# Patient Record
Sex: FEMALE | Race: WHITE | NOT HISPANIC OR LATINO
[De-identification: names, ages, dates, MRNs, and addresses within clinical notes are randomized per-mention and may not be internally consistent; named-entity substitution may affect disease eponyms.]

---

## 2018-05-01 ENCOUNTER — RESULT REVIEW (OUTPATIENT)
Age: 38
End: 2018-05-01

## 2018-08-09 ENCOUNTER — ASOB RESULT (OUTPATIENT)
Age: 38
End: 2018-08-09

## 2018-08-09 ENCOUNTER — APPOINTMENT (OUTPATIENT)
Dept: ANTEPARTUM | Facility: CLINIC | Age: 38
End: 2018-08-09
Payer: COMMERCIAL

## 2018-08-09 PROCEDURE — 76817 TRANSVAGINAL US OBSTETRIC: CPT

## 2018-08-09 PROCEDURE — 76811 OB US DETAILED SNGL FETUS: CPT

## 2018-08-09 PROCEDURE — 99241 OFFICE CONSULTATION NEW/ESTAB PATIENT 15 MIN: CPT | Mod: 25

## 2018-09-26 ENCOUNTER — APPOINTMENT (OUTPATIENT)
Dept: MATERNAL FETAL MEDICINE | Facility: CLINIC | Age: 38
End: 2018-09-26
Payer: COMMERCIAL

## 2018-09-26 PROCEDURE — G0109 DIAB MANAGE TRN IND/GROUP: CPT

## 2018-10-01 ENCOUNTER — APPOINTMENT (OUTPATIENT)
Dept: MATERNAL FETAL MEDICINE | Facility: CLINIC | Age: 38
End: 2018-10-01
Payer: COMMERCIAL

## 2018-10-01 ENCOUNTER — ASOB RESULT (OUTPATIENT)
Age: 38
End: 2018-10-01

## 2018-10-01 DIAGNOSIS — O24.419 GESTATIONAL DIABETES MELLITUS IN PREGNANCY, UNSPECIFIED CONTROL: ICD-10-CM

## 2018-10-01 PROCEDURE — G0109 DIAB MANAGE TRN IND/GROUP: CPT

## 2018-10-01 RX ORDER — LANCETS 33 GAUGE
EACH MISCELLANEOUS
Qty: 2 | Refills: 6 | Status: COMPLETED | COMMUNITY
Start: 2018-10-01 | End: 2019-10-01

## 2018-10-01 RX ORDER — URINE ACETONE TEST STRIPS
STRIP MISCELLANEOUS
Qty: 1 | Refills: 1 | Status: COMPLETED | COMMUNITY
Start: 2018-10-01 | End: 2019-10-01

## 2018-10-08 ENCOUNTER — APPOINTMENT (OUTPATIENT)
Dept: MATERNAL FETAL MEDICINE | Facility: CLINIC | Age: 38
End: 2018-10-08
Payer: COMMERCIAL

## 2018-10-08 ENCOUNTER — ASOB RESULT (OUTPATIENT)
Age: 38
End: 2018-10-08

## 2018-10-08 PROCEDURE — G0108 DIAB MANAGE TRN  PER INDIV: CPT

## 2018-10-08 RX ORDER — ISOPROPYL ALCOHOL 70 ML/100ML
SWAB TOPICAL
Qty: 1 | Refills: 0 | Status: ACTIVE | COMMUNITY
Start: 2018-10-08 | End: 1900-01-01

## 2018-10-15 ENCOUNTER — MEDICATION RENEWAL (OUTPATIENT)
Age: 38
End: 2018-10-15

## 2018-10-15 RX ORDER — SYRING-NEEDL,DISP,INSUL,0.3 ML 31 GX5/16"
31G X 5/16" SYRINGE, EMPTY DISPOSABLE MISCELLANEOUS
Qty: 1 | Refills: 3 | Status: ACTIVE | COMMUNITY
Start: 2018-10-15 | End: 1900-01-01

## 2018-10-15 RX ORDER — PEN NEEDLE, DIABETIC 29 G X1/2"
32G X 4 MM NEEDLE, DISPOSABLE MISCELLANEOUS
Qty: 1 | Refills: 1 | Status: DISCONTINUED | COMMUNITY
Start: 2018-10-08 | End: 2018-10-15

## 2018-10-15 RX ORDER — INSULIN HUMAN 100 [IU]/ML
100 INJECTION, SUSPENSION SUBCUTANEOUS
Qty: 1 | Refills: 1 | Status: DISCONTINUED | COMMUNITY
Start: 2018-10-08 | End: 2018-10-15

## 2018-10-15 RX ORDER — HUMAN INSULIN 100 [IU]/ML
100 INJECTION, SUSPENSION SUBCUTANEOUS
Qty: 1 | Refills: 2 | Status: ACTIVE | COMMUNITY
Start: 2018-10-15 | End: 1900-01-01

## 2018-10-19 ENCOUNTER — APPOINTMENT (OUTPATIENT)
Dept: MATERNAL FETAL MEDICINE | Facility: CLINIC | Age: 38
End: 2018-10-19
Payer: COMMERCIAL

## 2018-10-19 ENCOUNTER — ASOB RESULT (OUTPATIENT)
Age: 38
End: 2018-10-19

## 2018-10-19 PROCEDURE — G0108 DIAB MANAGE TRN  PER INDIV: CPT

## 2018-10-26 ENCOUNTER — APPOINTMENT (OUTPATIENT)
Dept: ULTRASOUND IMAGING | Facility: CLINIC | Age: 38
End: 2018-10-26
Payer: COMMERCIAL

## 2018-10-26 ENCOUNTER — OUTPATIENT (OUTPATIENT)
Dept: OUTPATIENT SERVICES | Facility: HOSPITAL | Age: 38
LOS: 1 days | End: 2018-10-26
Payer: COMMERCIAL

## 2018-10-26 DIAGNOSIS — Z00.8 ENCOUNTER FOR OTHER GENERAL EXAMINATION: ICD-10-CM

## 2018-10-26 PROCEDURE — 93971 EXTREMITY STUDY: CPT

## 2018-10-26 PROCEDURE — 93971 EXTREMITY STUDY: CPT | Mod: 26,RT

## 2018-11-02 ENCOUNTER — ASOB RESULT (OUTPATIENT)
Age: 38
End: 2018-11-02

## 2018-11-02 ENCOUNTER — APPOINTMENT (OUTPATIENT)
Dept: MATERNAL FETAL MEDICINE | Facility: CLINIC | Age: 38
End: 2018-11-02
Payer: COMMERCIAL

## 2018-11-02 PROCEDURE — G0108 DIAB MANAGE TRN  PER INDIV: CPT

## 2018-11-30 ENCOUNTER — OTHER (OUTPATIENT)
Age: 38
End: 2018-11-30

## 2018-12-07 ENCOUNTER — TRANSCRIPTION ENCOUNTER (OUTPATIENT)
Age: 38
End: 2018-12-07

## 2018-12-07 ENCOUNTER — INPATIENT (INPATIENT)
Facility: HOSPITAL | Age: 38
LOS: 3 days | Discharge: HOME | End: 2018-12-11
Attending: OBSTETRICS & GYNECOLOGY | Admitting: OBSTETRICS & GYNECOLOGY
Payer: COMMERCIAL

## 2018-12-07 VITALS — WEIGHT: 257.94 LBS | HEIGHT: 70 IN

## 2018-12-07 DIAGNOSIS — O26.899 OTHER SPECIFIED PREGNANCY RELATED CONDITIONS, UNSPECIFIED TRIMESTER: ICD-10-CM

## 2018-12-07 DIAGNOSIS — Z34.80 ENCOUNTER FOR SUPERVISION OF OTHER NORMAL PREGNANCY, UNSPECIFIED TRIMESTER: ICD-10-CM

## 2018-12-07 DIAGNOSIS — Z3A.00 WEEKS OF GESTATION OF PREGNANCY NOT SPECIFIED: ICD-10-CM

## 2018-12-07 LAB
ALBUMIN SERPL ELPH-MCNC: 3.1 G/DL — LOW (ref 3.3–5)
ALBUMIN SERPL ELPH-MCNC: 3.7 G/DL — SIGNIFICANT CHANGE UP (ref 3.3–5)
ALP SERPL-CCNC: 119 U/L — SIGNIFICANT CHANGE UP (ref 40–120)
ALP SERPL-CCNC: 136 U/L — HIGH (ref 40–120)
ALT FLD-CCNC: 10 U/L — SIGNIFICANT CHANGE UP (ref 10–45)
ALT FLD-CCNC: 12 U/L — SIGNIFICANT CHANGE UP (ref 10–45)
ANION GAP SERPL CALC-SCNC: 12 MMOL/L — SIGNIFICANT CHANGE UP (ref 5–17)
ANION GAP SERPL CALC-SCNC: 13 MMOL/L — SIGNIFICANT CHANGE UP (ref 5–17)
APPEARANCE UR: CLEAR — SIGNIFICANT CHANGE UP
APTT BLD: 26.4 SEC — LOW (ref 27.5–36.3)
APTT BLD: 27.6 SEC — SIGNIFICANT CHANGE UP (ref 27.5–36.3)
AST SERPL-CCNC: 14 U/L — SIGNIFICANT CHANGE UP (ref 10–40)
AST SERPL-CCNC: 14 U/L — SIGNIFICANT CHANGE UP (ref 10–40)
BASOPHILS # BLD AUTO: 0 K/UL — SIGNIFICANT CHANGE UP (ref 0–0.2)
BASOPHILS # BLD AUTO: 0 K/UL — SIGNIFICANT CHANGE UP (ref 0–0.2)
BASOPHILS NFR BLD AUTO: 0.1 % — SIGNIFICANT CHANGE UP (ref 0–2)
BASOPHILS NFR BLD AUTO: 0.2 % — SIGNIFICANT CHANGE UP (ref 0–2)
BILIRUB SERPL-MCNC: 1 MG/DL — SIGNIFICANT CHANGE UP (ref 0.2–1.2)
BILIRUB SERPL-MCNC: 1.2 MG/DL — SIGNIFICANT CHANGE UP (ref 0.2–1.2)
BILIRUB UR-MCNC: NEGATIVE — SIGNIFICANT CHANGE UP
BLD GP AB SCN SERPL QL: NEGATIVE — SIGNIFICANT CHANGE UP
BUN SERPL-MCNC: 6 MG/DL — LOW (ref 7–23)
BUN SERPL-MCNC: 7 MG/DL — SIGNIFICANT CHANGE UP (ref 7–23)
CALCIUM SERPL-MCNC: 8.4 MG/DL — SIGNIFICANT CHANGE UP (ref 8.4–10.5)
CALCIUM SERPL-MCNC: 9 MG/DL — SIGNIFICANT CHANGE UP (ref 8.4–10.5)
CHLORIDE SERPL-SCNC: 103 MMOL/L — SIGNIFICANT CHANGE UP (ref 96–108)
CHLORIDE SERPL-SCNC: 104 MMOL/L — SIGNIFICANT CHANGE UP (ref 96–108)
CO2 SERPL-SCNC: 20 MMOL/L — LOW (ref 22–31)
CO2 SERPL-SCNC: 22 MMOL/L — SIGNIFICANT CHANGE UP (ref 22–31)
COLOR SPEC: SIGNIFICANT CHANGE UP
CREAT ?TM UR-MCNC: 44 MG/DL — SIGNIFICANT CHANGE UP
CREAT SERPL-MCNC: 0.45 MG/DL — LOW (ref 0.5–1.3)
CREAT SERPL-MCNC: 0.46 MG/DL — LOW (ref 0.5–1.3)
DIFF PNL FLD: NEGATIVE — SIGNIFICANT CHANGE UP
EOSINOPHIL # BLD AUTO: 0.1 K/UL — SIGNIFICANT CHANGE UP (ref 0–0.5)
EOSINOPHIL # BLD AUTO: 0.1 K/UL — SIGNIFICANT CHANGE UP (ref 0–0.5)
EOSINOPHIL NFR BLD AUTO: 0.9 % — SIGNIFICANT CHANGE UP (ref 0–6)
EOSINOPHIL NFR BLD AUTO: 0.9 % — SIGNIFICANT CHANGE UP (ref 0–6)
FIBRINOGEN PPP-MCNC: 695 MG/DL — HIGH (ref 350–510)
FIBRINOGEN PPP-MCNC: 890 MG/DL — HIGH (ref 350–510)
GLUCOSE BLDC GLUCOMTR-MCNC: 102 MG/DL — HIGH (ref 70–99)
GLUCOSE BLDC GLUCOMTR-MCNC: 76 MG/DL — SIGNIFICANT CHANGE UP (ref 70–99)
GLUCOSE BLDC GLUCOMTR-MCNC: 84 MG/DL — SIGNIFICANT CHANGE UP (ref 70–99)
GLUCOSE SERPL-MCNC: 114 MG/DL — HIGH (ref 70–99)
GLUCOSE SERPL-MCNC: 80 MG/DL — SIGNIFICANT CHANGE UP (ref 70–99)
GLUCOSE UR QL: NEGATIVE — SIGNIFICANT CHANGE UP
HCT VFR BLD CALC: 34.2 % — LOW (ref 34.5–45)
HCT VFR BLD CALC: 38.3 % — SIGNIFICANT CHANGE UP (ref 34.5–45)
HGB BLD-MCNC: 12 G/DL — SIGNIFICANT CHANGE UP (ref 11.5–15.5)
HGB BLD-MCNC: 13.5 G/DL — SIGNIFICANT CHANGE UP (ref 11.5–15.5)
INR BLD: 0.92 RATIO — SIGNIFICANT CHANGE UP (ref 0.88–1.16)
INR BLD: 0.98 RATIO — SIGNIFICANT CHANGE UP (ref 0.88–1.16)
KETONES UR-MCNC: NEGATIVE — SIGNIFICANT CHANGE UP
LDH SERPL L TO P-CCNC: 169 U/L — SIGNIFICANT CHANGE UP (ref 50–242)
LDH SERPL L TO P-CCNC: 194 U/L — SIGNIFICANT CHANGE UP (ref 50–242)
LEUKOCYTE ESTERASE UR-ACNC: NEGATIVE — SIGNIFICANT CHANGE UP
LYMPHOCYTES # BLD AUTO: 1.3 K/UL — SIGNIFICANT CHANGE UP (ref 1–3.3)
LYMPHOCYTES # BLD AUTO: 1.6 K/UL — SIGNIFICANT CHANGE UP (ref 1–3.3)
LYMPHOCYTES # BLD AUTO: 15.1 % — SIGNIFICANT CHANGE UP (ref 13–44)
LYMPHOCYTES # BLD AUTO: 19.4 % — SIGNIFICANT CHANGE UP (ref 13–44)
MCHC RBC-ENTMCNC: 29.8 PG — SIGNIFICANT CHANGE UP (ref 27–34)
MCHC RBC-ENTMCNC: 29.9 PG — SIGNIFICANT CHANGE UP (ref 27–34)
MCHC RBC-ENTMCNC: 35 GM/DL — SIGNIFICANT CHANGE UP (ref 32–36)
MCHC RBC-ENTMCNC: 35.2 GM/DL — SIGNIFICANT CHANGE UP (ref 32–36)
MCV RBC AUTO: 85.2 FL — SIGNIFICANT CHANGE UP (ref 80–100)
MCV RBC AUTO: 85.2 FL — SIGNIFICANT CHANGE UP (ref 80–100)
MONOCYTES # BLD AUTO: 0.5 K/UL — SIGNIFICANT CHANGE UP (ref 0–0.9)
MONOCYTES # BLD AUTO: 0.7 K/UL — SIGNIFICANT CHANGE UP (ref 0–0.9)
MONOCYTES NFR BLD AUTO: 5.3 % — SIGNIFICANT CHANGE UP (ref 2–14)
MONOCYTES NFR BLD AUTO: 8.2 % — SIGNIFICANT CHANGE UP (ref 2–14)
NEUTROPHILS # BLD AUTO: 6 K/UL — SIGNIFICANT CHANGE UP (ref 1.8–7.4)
NEUTROPHILS # BLD AUTO: 6.7 K/UL — SIGNIFICANT CHANGE UP (ref 1.8–7.4)
NEUTROPHILS NFR BLD AUTO: 71.4 % — SIGNIFICANT CHANGE UP (ref 43–77)
NEUTROPHILS NFR BLD AUTO: 78.5 % — HIGH (ref 43–77)
NITRITE UR-MCNC: NEGATIVE — SIGNIFICANT CHANGE UP
PH UR: 7 — SIGNIFICANT CHANGE UP (ref 5–8)
PLATELET # BLD AUTO: 152 K/UL — SIGNIFICANT CHANGE UP (ref 150–400)
PLATELET # BLD AUTO: 178 K/UL — SIGNIFICANT CHANGE UP (ref 150–400)
POTASSIUM SERPL-MCNC: 3.3 MMOL/L — LOW (ref 3.5–5.3)
POTASSIUM SERPL-MCNC: 3.6 MMOL/L — SIGNIFICANT CHANGE UP (ref 3.5–5.3)
POTASSIUM SERPL-SCNC: 3.3 MMOL/L — LOW (ref 3.5–5.3)
POTASSIUM SERPL-SCNC: 3.6 MMOL/L — SIGNIFICANT CHANGE UP (ref 3.5–5.3)
PROT ?TM UR-MCNC: 19 MG/DL — HIGH (ref 0–12)
PROT SERPL-MCNC: 6.2 G/DL — SIGNIFICANT CHANGE UP (ref 6–8.3)
PROT SERPL-MCNC: 7 G/DL — SIGNIFICANT CHANGE UP (ref 6–8.3)
PROT UR-MCNC: SIGNIFICANT CHANGE UP
PROT/CREAT UR-RTO: 0.4 RATIO — HIGH (ref 0–0.2)
PROTHROM AB SERPL-ACNC: 10.5 SEC — SIGNIFICANT CHANGE UP (ref 10–12.9)
PROTHROM AB SERPL-ACNC: 11.2 SEC — SIGNIFICANT CHANGE UP (ref 10–12.9)
RBC # BLD: 4.01 M/UL — SIGNIFICANT CHANGE UP (ref 3.8–5.2)
RBC # BLD: 4.5 M/UL — SIGNIFICANT CHANGE UP (ref 3.8–5.2)
RBC # FLD: 12.5 % — SIGNIFICANT CHANGE UP (ref 10.3–14.5)
RBC # FLD: 12.5 % — SIGNIFICANT CHANGE UP (ref 10.3–14.5)
RH IG SCN BLD-IMP: POSITIVE — SIGNIFICANT CHANGE UP
SODIUM SERPL-SCNC: 137 MMOL/L — SIGNIFICANT CHANGE UP (ref 135–145)
SODIUM SERPL-SCNC: 137 MMOL/L — SIGNIFICANT CHANGE UP (ref 135–145)
SP GR SPEC: 1.01 — SIGNIFICANT CHANGE UP (ref 1.01–1.02)
URATE SERPL-MCNC: 3.2 MG/DL — SIGNIFICANT CHANGE UP (ref 2.5–7)
URATE SERPL-MCNC: 3.7 MG/DL — SIGNIFICANT CHANGE UP (ref 2.5–7)
UROBILINOGEN FLD QL: NEGATIVE — SIGNIFICANT CHANGE UP
WBC # BLD: 8.4 K/UL — SIGNIFICANT CHANGE UP (ref 3.8–10.5)
WBC # BLD: 8.6 K/UL — SIGNIFICANT CHANGE UP (ref 3.8–10.5)
WBC # FLD AUTO: 8.4 K/UL — SIGNIFICANT CHANGE UP (ref 3.8–10.5)
WBC # FLD AUTO: 8.6 K/UL — SIGNIFICANT CHANGE UP (ref 3.8–10.5)

## 2018-12-07 RX ORDER — SODIUM CHLORIDE 9 MG/ML
3 INJECTION INTRAMUSCULAR; INTRAVENOUS; SUBCUTANEOUS EVERY 8 HOURS
Qty: 0 | Refills: 0 | Status: DISCONTINUED | OUTPATIENT
Start: 2018-12-07 | End: 2018-12-09

## 2018-12-07 RX ORDER — OXYTOCIN 10 UNIT/ML
333.33 VIAL (ML) INJECTION
Qty: 20 | Refills: 0 | Status: DISCONTINUED | OUTPATIENT
Start: 2018-12-07 | End: 2018-12-08

## 2018-12-07 RX ORDER — SODIUM CHLORIDE 9 MG/ML
1000 INJECTION, SOLUTION INTRAVENOUS
Qty: 0 | Refills: 0 | Status: DISCONTINUED | OUTPATIENT
Start: 2018-12-07 | End: 2018-12-08

## 2018-12-07 RX ORDER — CITRIC ACID/SODIUM CITRATE 300-500 MG
15 SOLUTION, ORAL ORAL EVERY 4 HOURS
Qty: 0 | Refills: 0 | Status: DISCONTINUED | OUTPATIENT
Start: 2018-12-07 | End: 2018-12-08

## 2018-12-07 RX ORDER — SODIUM CHLORIDE 9 MG/ML
500 INJECTION, SOLUTION INTRAVENOUS ONCE
Qty: 0 | Refills: 0 | Status: DISCONTINUED | OUTPATIENT
Start: 2018-12-07 | End: 2018-12-08

## 2018-12-07 RX ORDER — SODIUM CHLORIDE 9 MG/ML
1000 INJECTION INTRAMUSCULAR; INTRAVENOUS; SUBCUTANEOUS
Qty: 0 | Refills: 0 | Status: DISCONTINUED | OUTPATIENT
Start: 2018-12-07 | End: 2018-12-08

## 2018-12-08 ENCOUNTER — RESULT REVIEW (OUTPATIENT)
Age: 38
End: 2018-12-08

## 2018-12-08 LAB
ALBUMIN SERPL ELPH-MCNC: 2.6 G/DL — LOW (ref 3.3–5)
ALP SERPL-CCNC: 103 U/L — SIGNIFICANT CHANGE UP (ref 40–120)
ALT FLD-CCNC: 8 U/L — LOW (ref 10–45)
ANION GAP SERPL CALC-SCNC: 11 MMOL/L — SIGNIFICANT CHANGE UP (ref 5–17)
APTT BLD: 21.7 SEC — LOW (ref 27.5–36.3)
APTT BLD: 26.5 SEC — LOW (ref 27.5–36.3)
APTT BLD: 26.6 SEC — LOW (ref 27.5–36.3)
AST SERPL-CCNC: 14 U/L — SIGNIFICANT CHANGE UP (ref 10–40)
BASOPHILS # BLD AUTO: 0 K/UL — SIGNIFICANT CHANGE UP (ref 0–0.2)
BASOPHILS NFR BLD AUTO: 0 % — SIGNIFICANT CHANGE UP (ref 0–2)
BASOPHILS NFR BLD AUTO: 0.1 % — SIGNIFICANT CHANGE UP (ref 0–2)
BASOPHILS NFR BLD AUTO: 0.2 % — SIGNIFICANT CHANGE UP (ref 0–2)
BILIRUB SERPL-MCNC: 1.3 MG/DL — HIGH (ref 0.2–1.2)
BUN SERPL-MCNC: 6 MG/DL — LOW (ref 7–23)
CALCIUM SERPL-MCNC: 7.5 MG/DL — LOW (ref 8.4–10.5)
CHLORIDE SERPL-SCNC: 109 MMOL/L — HIGH (ref 96–108)
CO2 SERPL-SCNC: 20 MMOL/L — LOW (ref 22–31)
CREAT SERPL-MCNC: 0.48 MG/DL — LOW (ref 0.5–1.3)
EOSINOPHIL # BLD AUTO: 0.1 K/UL — SIGNIFICANT CHANGE UP (ref 0–0.5)
EOSINOPHIL NFR BLD AUTO: 0.5 % — SIGNIFICANT CHANGE UP (ref 0–6)
EOSINOPHIL NFR BLD AUTO: 0.6 % — SIGNIFICANT CHANGE UP (ref 0–6)
EOSINOPHIL NFR BLD AUTO: 0.7 % — SIGNIFICANT CHANGE UP (ref 0–6)
FIBRINOGEN PPP-MCNC: 556 MG/DL — HIGH (ref 350–510)
FIBRINOGEN PPP-MCNC: 579 MG/DL — HIGH (ref 350–510)
FIBRINOGEN PPP-MCNC: 600 MG/DL — HIGH (ref 350–510)
GLUCOSE BLDC GLUCOMTR-MCNC: 81 MG/DL — SIGNIFICANT CHANGE UP (ref 70–99)
GLUCOSE BLDC GLUCOMTR-MCNC: 85 MG/DL — SIGNIFICANT CHANGE UP (ref 70–99)
GLUCOSE BLDC GLUCOMTR-MCNC: 90 MG/DL — SIGNIFICANT CHANGE UP (ref 70–99)
GLUCOSE SERPL-MCNC: 108 MG/DL — HIGH (ref 70–99)
HCT VFR BLD CALC: 29.6 % — LOW (ref 34.5–45)
HCT VFR BLD CALC: 30.7 % — LOW (ref 34.5–45)
HCT VFR BLD CALC: 31.2 % — LOW (ref 34.5–45)
HGB BLD-MCNC: 10.6 G/DL — LOW (ref 11.5–15.5)
HGB BLD-MCNC: 10.9 G/DL — LOW (ref 11.5–15.5)
HGB BLD-MCNC: 10.9 G/DL — LOW (ref 11.5–15.5)
INR BLD: 0.98 RATIO — SIGNIFICANT CHANGE UP (ref 0.88–1.16)
INR BLD: 0.98 RATIO — SIGNIFICANT CHANGE UP (ref 0.88–1.16)
INR BLD: 1.03 RATIO — SIGNIFICANT CHANGE UP (ref 0.88–1.16)
LDH SERPL L TO P-CCNC: 176 U/L — SIGNIFICANT CHANGE UP (ref 50–242)
LYMPHOCYTES # BLD AUTO: 0.9 K/UL — LOW (ref 1–3.3)
LYMPHOCYTES # BLD AUTO: 1 K/UL — SIGNIFICANT CHANGE UP (ref 1–3.3)
LYMPHOCYTES # BLD AUTO: 1.6 K/UL — SIGNIFICANT CHANGE UP (ref 1–3.3)
LYMPHOCYTES # BLD AUTO: 10.4 % — LOW (ref 13–44)
LYMPHOCYTES # BLD AUTO: 7.1 % — LOW (ref 13–44)
LYMPHOCYTES # BLD AUTO: 9.8 % — LOW (ref 13–44)
MCHC RBC-ENTMCNC: 30.1 PG — SIGNIFICANT CHANGE UP (ref 27–34)
MCHC RBC-ENTMCNC: 30.6 PG — SIGNIFICANT CHANGE UP (ref 27–34)
MCHC RBC-ENTMCNC: 31 PG — SIGNIFICANT CHANGE UP (ref 27–34)
MCHC RBC-ENTMCNC: 34.8 GM/DL — SIGNIFICANT CHANGE UP (ref 32–36)
MCHC RBC-ENTMCNC: 35.4 GM/DL — SIGNIFICANT CHANGE UP (ref 32–36)
MCHC RBC-ENTMCNC: 35.9 GM/DL — SIGNIFICANT CHANGE UP (ref 32–36)
MCV RBC AUTO: 86.3 FL — SIGNIFICANT CHANGE UP (ref 80–100)
MCV RBC AUTO: 86.4 FL — SIGNIFICANT CHANGE UP (ref 80–100)
MCV RBC AUTO: 86.5 FL — SIGNIFICANT CHANGE UP (ref 80–100)
MONOCYTES # BLD AUTO: 0.7 K/UL — SIGNIFICANT CHANGE UP (ref 0–0.9)
MONOCYTES # BLD AUTO: 0.8 K/UL — SIGNIFICANT CHANGE UP (ref 0–0.9)
MONOCYTES # BLD AUTO: 1.1 K/UL — HIGH (ref 0–0.9)
MONOCYTES NFR BLD AUTO: 6.5 % — SIGNIFICANT CHANGE UP (ref 2–14)
MONOCYTES NFR BLD AUTO: 7.1 % — SIGNIFICANT CHANGE UP (ref 2–14)
MONOCYTES NFR BLD AUTO: 7.2 % — SIGNIFICANT CHANGE UP (ref 2–14)
NEUTROPHILS # BLD AUTO: 10.3 K/UL — HIGH (ref 1.8–7.4)
NEUTROPHILS # BLD AUTO: 12.8 K/UL — HIGH (ref 1.8–7.4)
NEUTROPHILS # BLD AUTO: 8.5 K/UL — HIGH (ref 1.8–7.4)
NEUTROPHILS NFR BLD AUTO: 81.7 % — HIGH (ref 43–77)
NEUTROPHILS NFR BLD AUTO: 82.5 % — HIGH (ref 43–77)
NEUTROPHILS NFR BLD AUTO: 85.7 % — HIGH (ref 43–77)
PLATELET # BLD AUTO: 115 K/UL — LOW (ref 150–400)
PLATELET # BLD AUTO: 139 K/UL — LOW (ref 150–400)
PLATELET # BLD AUTO: 179 K/UL — SIGNIFICANT CHANGE UP (ref 150–400)
POTASSIUM SERPL-MCNC: 3.7 MMOL/L — SIGNIFICANT CHANGE UP (ref 3.5–5.3)
POTASSIUM SERPL-SCNC: 3.7 MMOL/L — SIGNIFICANT CHANGE UP (ref 3.5–5.3)
PROT SERPL-MCNC: 5.1 G/DL — LOW (ref 6–8.3)
PROTHROM AB SERPL-ACNC: 11.3 SEC — SIGNIFICANT CHANGE UP (ref 10–12.9)
PROTHROM AB SERPL-ACNC: 11.3 SEC — SIGNIFICANT CHANGE UP (ref 10–12.9)
PROTHROM AB SERPL-ACNC: 11.9 SEC — SIGNIFICANT CHANGE UP (ref 10–12.9)
RBC # BLD: 3.42 M/UL — LOW (ref 3.8–5.2)
RBC # BLD: 3.55 M/UL — LOW (ref 3.8–5.2)
RBC # BLD: 3.61 M/UL — LOW (ref 3.8–5.2)
RBC # FLD: 12.4 % — SIGNIFICANT CHANGE UP (ref 10.3–14.5)
RBC # FLD: 12.7 % — SIGNIFICANT CHANGE UP (ref 10.3–14.5)
RBC # FLD: 12.9 % — SIGNIFICANT CHANGE UP (ref 10.3–14.5)
SODIUM SERPL-SCNC: 140 MMOL/L — SIGNIFICANT CHANGE UP (ref 135–145)
T PALLIDUM AB TITR SER: NEGATIVE — SIGNIFICANT CHANGE UP
URATE SERPL-MCNC: 3.6 MG/DL — SIGNIFICANT CHANGE UP (ref 2.5–7)
WBC # BLD: 10.3 K/UL — SIGNIFICANT CHANGE UP (ref 3.8–10.5)
WBC # BLD: 12.1 K/UL — HIGH (ref 3.8–10.5)
WBC # BLD: 15.6 K/UL — HIGH (ref 3.8–10.5)
WBC # FLD AUTO: 10.3 K/UL — SIGNIFICANT CHANGE UP (ref 3.8–10.5)
WBC # FLD AUTO: 12.1 K/UL — HIGH (ref 3.8–10.5)
WBC # FLD AUTO: 15.6 K/UL — HIGH (ref 3.8–10.5)

## 2018-12-08 PROCEDURE — 88307 TISSUE EXAM BY PATHOLOGIST: CPT | Mod: 26

## 2018-12-08 PROCEDURE — 88302 TISSUE EXAM BY PATHOLOGIST: CPT | Mod: 26

## 2018-12-08 RX ORDER — OXYTOCIN 10 UNIT/ML
41.67 VIAL (ML) INJECTION
Qty: 20 | Refills: 0 | Status: DISCONTINUED | OUTPATIENT
Start: 2018-12-08 | End: 2018-12-09

## 2018-12-08 RX ORDER — ACETAMINOPHEN 500 MG
975 TABLET ORAL EVERY 6 HOURS
Qty: 0 | Refills: 0 | Status: DISCONTINUED | OUTPATIENT
Start: 2018-12-08 | End: 2018-12-09

## 2018-12-08 RX ORDER — ACETAMINOPHEN 500 MG
1000 TABLET ORAL ONCE
Qty: 0 | Refills: 0 | Status: COMPLETED | OUTPATIENT
Start: 2018-12-08 | End: 2018-12-08

## 2018-12-08 RX ORDER — IBUPROFEN 200 MG
600 TABLET ORAL EVERY 6 HOURS
Qty: 0 | Refills: 0 | Status: DISCONTINUED | OUTPATIENT
Start: 2018-12-08 | End: 2018-12-09

## 2018-12-08 RX ORDER — TETANUS TOXOID, REDUCED DIPHTHERIA TOXOID AND ACELLULAR PERTUSSIS VACCINE, ADSORBED 5; 2.5; 8; 8; 2.5 [IU]/.5ML; [IU]/.5ML; UG/.5ML; UG/.5ML; UG/.5ML
0.5 SUSPENSION INTRAMUSCULAR ONCE
Qty: 0 | Refills: 0 | Status: DISCONTINUED | OUTPATIENT
Start: 2018-12-09 | End: 2018-12-11

## 2018-12-08 RX ORDER — SODIUM CHLORIDE 9 MG/ML
1000 INJECTION, SOLUTION INTRAVENOUS
Qty: 0 | Refills: 0 | Status: DISCONTINUED | OUTPATIENT
Start: 2018-12-08 | End: 2018-12-08

## 2018-12-08 RX ORDER — NALOXONE HYDROCHLORIDE 4 MG/.1ML
0.1 SPRAY NASAL
Qty: 0 | Refills: 0 | Status: DISCONTINUED | OUTPATIENT
Start: 2018-12-08 | End: 2018-12-10

## 2018-12-08 RX ORDER — DOCUSATE SODIUM 100 MG
100 CAPSULE ORAL
Qty: 0 | Refills: 0 | Status: DISCONTINUED | OUTPATIENT
Start: 2018-12-09 | End: 2018-12-09

## 2018-12-08 RX ORDER — SODIUM CHLORIDE 9 MG/ML
1000 INJECTION INTRAMUSCULAR; INTRAVENOUS; SUBCUTANEOUS
Qty: 0 | Refills: 0 | Status: DISCONTINUED | OUTPATIENT
Start: 2018-12-08 | End: 2018-12-09

## 2018-12-08 RX ORDER — DIPHENHYDRAMINE HCL 50 MG
25 CAPSULE ORAL EVERY 6 HOURS
Qty: 0 | Refills: 0 | Status: DISCONTINUED | OUTPATIENT
Start: 2018-12-09 | End: 2018-12-09

## 2018-12-08 RX ORDER — SODIUM CHLORIDE 9 MG/ML
1000 INJECTION, SOLUTION INTRAVENOUS
Qty: 0 | Refills: 0 | Status: DISCONTINUED | OUTPATIENT
Start: 2018-12-09 | End: 2018-12-09

## 2018-12-08 RX ORDER — LANOLIN
1 OINTMENT (GRAM) TOPICAL
Qty: 0 | Refills: 0 | Status: DISCONTINUED | OUTPATIENT
Start: 2018-12-09 | End: 2018-12-11

## 2018-12-08 RX ORDER — KETOROLAC TROMETHAMINE 30 MG/ML
30 SYRINGE (ML) INJECTION EVERY 6 HOURS
Qty: 0 | Refills: 0 | Status: DISCONTINUED | OUTPATIENT
Start: 2018-12-08 | End: 2018-12-09

## 2018-12-08 RX ORDER — GLYCERIN ADULT
1 SUPPOSITORY, RECTAL RECTAL AT BEDTIME
Qty: 0 | Refills: 0 | Status: DISCONTINUED | OUTPATIENT
Start: 2018-12-09 | End: 2018-12-11

## 2018-12-08 RX ORDER — SODIUM CHLORIDE 9 MG/ML
500 INJECTION INTRAMUSCULAR; INTRAVENOUS; SUBCUTANEOUS ONCE
Qty: 0 | Refills: 0 | Status: DISCONTINUED | OUTPATIENT
Start: 2018-12-08 | End: 2018-12-09

## 2018-12-08 RX ORDER — OXYTOCIN 10 UNIT/ML
4 VIAL (ML) INJECTION
Qty: 30 | Refills: 0 | Status: DISCONTINUED | OUTPATIENT
Start: 2018-12-08 | End: 2018-12-08

## 2018-12-08 RX ORDER — OXYCODONE HYDROCHLORIDE 5 MG/1
5 TABLET ORAL EVERY 4 HOURS
Qty: 0 | Refills: 0 | Status: DISCONTINUED | OUTPATIENT
Start: 2018-12-08 | End: 2018-12-09

## 2018-12-08 RX ORDER — CITRIC ACID/SODIUM CITRATE 300-500 MG
15 SOLUTION, ORAL ORAL EVERY 4 HOURS
Qty: 0 | Refills: 0 | Status: DISCONTINUED | OUTPATIENT
Start: 2018-12-08 | End: 2018-12-08

## 2018-12-08 RX ORDER — FERROUS SULFATE 325(65) MG
325 TABLET ORAL DAILY
Qty: 0 | Refills: 0 | Status: DISCONTINUED | OUTPATIENT
Start: 2018-12-09 | End: 2018-12-11

## 2018-12-08 RX ORDER — OXYCODONE HYDROCHLORIDE 5 MG/1
5 TABLET ORAL
Qty: 0 | Refills: 0 | Status: DISCONTINUED | OUTPATIENT
Start: 2018-12-08 | End: 2018-12-09

## 2018-12-08 RX ORDER — SIMETHICONE 80 MG/1
80 TABLET, CHEWABLE ORAL EVERY 4 HOURS
Qty: 0 | Refills: 0 | Status: DISCONTINUED | OUTPATIENT
Start: 2018-12-09 | End: 2018-12-09

## 2018-12-08 RX ORDER — HEPARIN SODIUM 5000 [USP'U]/ML
5000 INJECTION INTRAVENOUS; SUBCUTANEOUS EVERY 12 HOURS
Qty: 0 | Refills: 0 | Status: DISCONTINUED | OUTPATIENT
Start: 2018-12-08 | End: 2018-12-08

## 2018-12-08 RX ORDER — DEXAMETHASONE 0.5 MG/5ML
4 ELIXIR ORAL EVERY 6 HOURS
Qty: 0 | Refills: 0 | Status: DISCONTINUED | OUTPATIENT
Start: 2018-12-08 | End: 2018-12-11

## 2018-12-08 RX ORDER — ONDANSETRON 8 MG/1
4 TABLET, FILM COATED ORAL EVERY 6 HOURS
Qty: 0 | Refills: 0 | Status: DISCONTINUED | OUTPATIENT
Start: 2018-12-08 | End: 2018-12-11

## 2018-12-08 RX ORDER — OXYTOCIN 10 UNIT/ML
4 VIAL (ML) INJECTION
Qty: 30 | Refills: 0 | Status: DISCONTINUED | OUTPATIENT
Start: 2018-12-08 | End: 2018-12-09

## 2018-12-08 RX ORDER — OXYTOCIN 10 UNIT/ML
333.33 VIAL (ML) INJECTION
Qty: 20 | Refills: 0 | Status: DISCONTINUED | OUTPATIENT
Start: 2018-12-08 | End: 2018-12-09

## 2018-12-08 RX ORDER — OXYTOCIN 10 UNIT/ML
41.67 VIAL (ML) INJECTION
Qty: 20 | Refills: 0 | Status: DISCONTINUED | OUTPATIENT
Start: 2018-12-09 | End: 2018-12-09

## 2018-12-08 RX ORDER — OXYTOCIN 10 UNIT/ML
333.33 VIAL (ML) INJECTION
Qty: 20 | Refills: 0 | Status: DISCONTINUED | OUTPATIENT
Start: 2018-12-08 | End: 2018-12-08

## 2018-12-08 RX ADMIN — Medication 125 MILLIUNIT(S)/MIN: at 14:11

## 2018-12-08 RX ADMIN — Medication 4 MILLIUNIT(S)/MIN: at 05:26

## 2018-12-08 RX ADMIN — Medication 400 MILLIGRAM(S): at 14:37

## 2018-12-08 RX ADMIN — Medication 30 MILLIGRAM(S): at 20:46

## 2018-12-08 RX ADMIN — Medication 1000 MILLIGRAM(S): at 19:07

## 2018-12-08 RX ADMIN — Medication 4 MILLIUNIT(S)/MIN: at 09:23

## 2018-12-08 NOTE — PROVIDER CONTACT NOTE (OTHER) - BACKGROUND
Pt is a  37.5wk primary C/S for NRFHRT; s/p PPH with transfusion; Pt v/s stable; fundus firm; bleeding light; pt c/o 7/10 pain unrelieved by PCEA bolus

## 2018-12-09 LAB
ALBUMIN SERPL ELPH-MCNC: 2.3 G/DL — LOW (ref 3.3–5)
ALP SERPL-CCNC: 96 U/L — SIGNIFICANT CHANGE UP (ref 40–120)
ALT FLD-CCNC: 11 U/L — SIGNIFICANT CHANGE UP (ref 10–45)
ANION GAP SERPL CALC-SCNC: 13 MMOL/L — SIGNIFICANT CHANGE UP (ref 5–17)
APTT BLD: 27.8 SEC — SIGNIFICANT CHANGE UP (ref 27.5–36.3)
APTT BLD: 28.2 SEC — SIGNIFICANT CHANGE UP (ref 27.5–36.3)
APTT BLD: 28.4 SEC — SIGNIFICANT CHANGE UP (ref 27.5–36.3)
AST SERPL-CCNC: 22 U/L — SIGNIFICANT CHANGE UP (ref 10–40)
BASOPHILS # BLD AUTO: 0 K/UL — SIGNIFICANT CHANGE UP (ref 0–0.2)
BASOPHILS NFR BLD AUTO: 0 % — SIGNIFICANT CHANGE UP (ref 0–2)
BASOPHILS NFR BLD AUTO: 0.1 % — SIGNIFICANT CHANGE UP (ref 0–2)
BILIRUB DIRECT SERPL-MCNC: 0.2 MG/DL — SIGNIFICANT CHANGE UP (ref 0–0.2)
BILIRUB INDIRECT FLD-MCNC: 0.5 MG/DL — SIGNIFICANT CHANGE UP (ref 0.2–1)
BILIRUB SERPL-MCNC: 0.7 MG/DL — SIGNIFICANT CHANGE UP (ref 0.2–1.2)
BLD GP AB SCN SERPL QL: NEGATIVE — SIGNIFICANT CHANGE UP
BUN SERPL-MCNC: 8 MG/DL — SIGNIFICANT CHANGE UP (ref 7–23)
CALCIUM SERPL-MCNC: 7.9 MG/DL — LOW (ref 8.4–10.5)
CHLORIDE SERPL-SCNC: 108 MMOL/L — SIGNIFICANT CHANGE UP (ref 96–108)
CO2 SERPL-SCNC: 20 MMOL/L — LOW (ref 22–31)
CREAT SERPL-MCNC: 0.54 MG/DL — SIGNIFICANT CHANGE UP (ref 0.5–1.3)
EOSINOPHIL # BLD AUTO: 0 K/UL — SIGNIFICANT CHANGE UP (ref 0–0.5)
EOSINOPHIL # BLD AUTO: 0.1 K/UL — SIGNIFICANT CHANGE UP (ref 0–0.5)
EOSINOPHIL NFR BLD AUTO: 0.2 % — SIGNIFICANT CHANGE UP (ref 0–6)
EOSINOPHIL NFR BLD AUTO: 0.3 % — SIGNIFICANT CHANGE UP (ref 0–6)
EOSINOPHIL NFR BLD AUTO: 0.4 % — SIGNIFICANT CHANGE UP (ref 0–6)
EOSINOPHIL NFR BLD AUTO: 1 % — SIGNIFICANT CHANGE UP (ref 0–6)
FIBRINOGEN PPP-MCNC: 527 MG/DL — HIGH (ref 350–510)
FIBRINOGEN PPP-MCNC: 637 MG/DL — HIGH (ref 350–510)
FIBRINOGEN PPP-MCNC: 860 MG/DL — HIGH (ref 350–510)
GLUCOSE BLDC GLUCOMTR-MCNC: 123 MG/DL — HIGH (ref 70–99)
GLUCOSE SERPL-MCNC: 175 MG/DL — HIGH (ref 70–99)
HAPTOGLOB SERPL-MCNC: 114 MG/DL — SIGNIFICANT CHANGE UP (ref 34–200)
HCT VFR BLD CALC: 27.7 % — LOW (ref 34.5–45)
HCT VFR BLD CALC: 28.6 % — LOW (ref 34.5–45)
HCT VFR BLD CALC: 30.9 % — LOW (ref 34.5–45)
HCT VFR BLD CALC: 33.7 % — LOW (ref 34.5–45)
HGB BLD-MCNC: 10 G/DL — LOW (ref 11.5–15.5)
HGB BLD-MCNC: 10.7 G/DL — LOW (ref 11.5–15.5)
HGB BLD-MCNC: 11.7 G/DL — SIGNIFICANT CHANGE UP (ref 11.5–15.5)
HGB BLD-MCNC: 9.9 G/DL — LOW (ref 11.5–15.5)
INR BLD: 1.02 RATIO — SIGNIFICANT CHANGE UP (ref 0.88–1.16)
INR BLD: 1.03 RATIO — SIGNIFICANT CHANGE UP (ref 0.88–1.16)
INR BLD: 1.03 RATIO — SIGNIFICANT CHANGE UP (ref 0.88–1.16)
LDH SERPL L TO P-CCNC: 245 U/L — HIGH (ref 50–242)
LYMPHOCYTES # BLD AUTO: 0.9 K/UL — LOW (ref 1–3.3)
LYMPHOCYTES # BLD AUTO: 1 K/UL — SIGNIFICANT CHANGE UP (ref 1–3.3)
LYMPHOCYTES # BLD AUTO: 1.2 K/UL — SIGNIFICANT CHANGE UP (ref 1–3.3)
LYMPHOCYTES # BLD AUTO: 1.6 K/UL — SIGNIFICANT CHANGE UP (ref 1–3.3)
LYMPHOCYTES # BLD AUTO: 10.2 % — LOW (ref 13–44)
LYMPHOCYTES # BLD AUTO: 10.4 % — LOW (ref 13–44)
LYMPHOCYTES # BLD AUTO: 10.5 % — LOW (ref 13–44)
LYMPHOCYTES # BLD AUTO: 16.2 % — SIGNIFICANT CHANGE UP (ref 13–44)
MAGNESIUM SERPL-MCNC: 1.5 MG/DL — LOW (ref 1.6–2.6)
MCHC RBC-ENTMCNC: 30.2 PG — SIGNIFICANT CHANGE UP (ref 27–34)
MCHC RBC-ENTMCNC: 30.2 PG — SIGNIFICANT CHANGE UP (ref 27–34)
MCHC RBC-ENTMCNC: 30.4 PG — SIGNIFICANT CHANGE UP (ref 27–34)
MCHC RBC-ENTMCNC: 31.2 PG — SIGNIFICANT CHANGE UP (ref 27–34)
MCHC RBC-ENTMCNC: 34.5 GM/DL — SIGNIFICANT CHANGE UP (ref 32–36)
MCHC RBC-ENTMCNC: 34.6 GM/DL — SIGNIFICANT CHANGE UP (ref 32–36)
MCHC RBC-ENTMCNC: 34.9 GM/DL — SIGNIFICANT CHANGE UP (ref 32–36)
MCHC RBC-ENTMCNC: 35.9 GM/DL — SIGNIFICANT CHANGE UP (ref 32–36)
MCV RBC AUTO: 86.9 FL — SIGNIFICANT CHANGE UP (ref 80–100)
MCV RBC AUTO: 87 FL — SIGNIFICANT CHANGE UP (ref 80–100)
MCV RBC AUTO: 87.1 FL — SIGNIFICANT CHANGE UP (ref 80–100)
MCV RBC AUTO: 87.5 FL — SIGNIFICANT CHANGE UP (ref 80–100)
MONOCYTES # BLD AUTO: 0.4 K/UL — SIGNIFICANT CHANGE UP (ref 0–0.9)
MONOCYTES # BLD AUTO: 0.6 K/UL — SIGNIFICANT CHANGE UP (ref 0–0.9)
MONOCYTES # BLD AUTO: 0.8 K/UL — SIGNIFICANT CHANGE UP (ref 0–0.9)
MONOCYTES # BLD AUTO: 0.9 K/UL — SIGNIFICANT CHANGE UP (ref 0–0.9)
MONOCYTES NFR BLD AUTO: 3.6 % — SIGNIFICANT CHANGE UP (ref 2–14)
MONOCYTES NFR BLD AUTO: 7.2 % — SIGNIFICANT CHANGE UP (ref 2–14)
MONOCYTES NFR BLD AUTO: 8.7 % — SIGNIFICANT CHANGE UP (ref 2–14)
MONOCYTES NFR BLD AUTO: 8.8 % — SIGNIFICANT CHANGE UP (ref 2–14)
NEUTROPHILS # BLD AUTO: 10.1 K/UL — HIGH (ref 1.8–7.4)
NEUTROPHILS # BLD AUTO: 6.9 K/UL — SIGNIFICANT CHANGE UP (ref 1.8–7.4)
NEUTROPHILS # BLD AUTO: 7.2 K/UL — SIGNIFICANT CHANGE UP (ref 1.8–7.4)
NEUTROPHILS # BLD AUTO: 8 K/UL — HIGH (ref 1.8–7.4)
NEUTROPHILS NFR BLD AUTO: 74.6 % — SIGNIFICANT CHANGE UP (ref 43–77)
NEUTROPHILS NFR BLD AUTO: 80.4 % — HIGH (ref 43–77)
NEUTROPHILS NFR BLD AUTO: 82.2 % — HIGH (ref 43–77)
NEUTROPHILS NFR BLD AUTO: 85.2 % — HIGH (ref 43–77)
PHOSPHATE SERPL-MCNC: 2.8 MG/DL — SIGNIFICANT CHANGE UP (ref 2.5–4.5)
PLATELET # BLD AUTO: 118 K/UL — LOW (ref 150–400)
PLATELET # BLD AUTO: 135 K/UL — LOW (ref 150–400)
PLATELET # BLD AUTO: 149 K/UL — LOW (ref 150–400)
PLATELET # BLD AUTO: 89 K/UL — LOW (ref 150–400)
POTASSIUM SERPL-MCNC: 3.5 MMOL/L — SIGNIFICANT CHANGE UP (ref 3.5–5.3)
POTASSIUM SERPL-SCNC: 3.5 MMOL/L — SIGNIFICANT CHANGE UP (ref 3.5–5.3)
PROT SERPL-MCNC: 5.1 G/DL — LOW (ref 6–8.3)
PROTHROM AB SERPL-ACNC: 11.6 SEC — SIGNIFICANT CHANGE UP (ref 10–12.9)
PROTHROM AB SERPL-ACNC: 11.8 SEC — SIGNIFICANT CHANGE UP (ref 10–12.9)
PROTHROM AB SERPL-ACNC: 11.9 SEC — SIGNIFICANT CHANGE UP (ref 10–12.9)
RBC # BLD: 3.19 M/UL — LOW (ref 3.8–5.2)
RBC # BLD: 3.28 M/UL — LOW (ref 3.8–5.2)
RBC # BLD: 3.53 M/UL — LOW (ref 3.8–5.2)
RBC # BLD: 3.87 M/UL — SIGNIFICANT CHANGE UP (ref 3.8–5.2)
RBC # FLD: 12.2 % — SIGNIFICANT CHANGE UP (ref 10.3–14.5)
RBC # FLD: 12.5 % — SIGNIFICANT CHANGE UP (ref 10.3–14.5)
RBC # FLD: 12.5 % — SIGNIFICANT CHANGE UP (ref 10.3–14.5)
RBC # FLD: 13.1 % — SIGNIFICANT CHANGE UP (ref 10.3–14.5)
RH IG SCN BLD-IMP: POSITIVE — SIGNIFICANT CHANGE UP
SODIUM SERPL-SCNC: 141 MMOL/L — SIGNIFICANT CHANGE UP (ref 135–145)
WBC # BLD: 10 K/UL — SIGNIFICANT CHANGE UP (ref 3.8–10.5)
WBC # BLD: 11.8 K/UL — HIGH (ref 3.8–10.5)
WBC # BLD: 8.4 K/UL — SIGNIFICANT CHANGE UP (ref 3.8–10.5)
WBC # BLD: 9.6 K/UL — SIGNIFICANT CHANGE UP (ref 3.8–10.5)
WBC # FLD AUTO: 10 K/UL — SIGNIFICANT CHANGE UP (ref 3.8–10.5)
WBC # FLD AUTO: 11.8 K/UL — HIGH (ref 3.8–10.5)
WBC # FLD AUTO: 8.4 K/UL — SIGNIFICANT CHANGE UP (ref 3.8–10.5)
WBC # FLD AUTO: 9.6 K/UL — SIGNIFICANT CHANGE UP (ref 3.8–10.5)

## 2018-12-09 PROCEDURE — 71045 X-RAY EXAM CHEST 1 VIEW: CPT | Mod: 26

## 2018-12-09 PROCEDURE — 93010 ELECTROCARDIOGRAM REPORT: CPT

## 2018-12-09 RX ORDER — ACETAMINOPHEN 500 MG
975 TABLET ORAL EVERY 6 HOURS
Qty: 0 | Refills: 0 | Status: DISCONTINUED | OUTPATIENT
Start: 2018-12-09 | End: 2018-12-11

## 2018-12-09 RX ORDER — MAGNESIUM SULFATE 500 MG/ML
2 VIAL (ML) INJECTION
Qty: 0 | Refills: 0 | Status: COMPLETED | OUTPATIENT
Start: 2018-12-09 | End: 2018-12-09

## 2018-12-09 RX ORDER — DIPHENHYDRAMINE HCL 50 MG
50 CAPSULE ORAL ONCE
Qty: 0 | Refills: 0 | Status: COMPLETED | OUTPATIENT
Start: 2018-12-09 | End: 2018-12-09

## 2018-12-09 RX ORDER — HYDROCORTISONE 1 %
1 OINTMENT (GRAM) TOPICAL
Qty: 0 | Refills: 0 | Status: DISCONTINUED | OUTPATIENT
Start: 2018-12-09 | End: 2018-12-11

## 2018-12-09 RX ORDER — SODIUM CHLORIDE 9 MG/ML
1000 INJECTION, SOLUTION INTRAVENOUS ONCE
Qty: 0 | Refills: 0 | Status: DISCONTINUED | OUTPATIENT
Start: 2018-12-09 | End: 2018-12-09

## 2018-12-09 RX ORDER — DOCUSATE SODIUM 100 MG
100 CAPSULE ORAL THREE TIMES A DAY
Qty: 0 | Refills: 0 | Status: DISCONTINUED | OUTPATIENT
Start: 2018-12-09 | End: 2018-12-11

## 2018-12-09 RX ORDER — POTASSIUM CHLORIDE 20 MEQ
20 PACKET (EA) ORAL
Qty: 0 | Refills: 0 | Status: COMPLETED | OUTPATIENT
Start: 2018-12-09 | End: 2018-12-10

## 2018-12-09 RX ORDER — CHLORHEXIDINE GLUCONATE 213 G/1000ML
1 SOLUTION TOPICAL
Qty: 0 | Refills: 0 | Status: DISCONTINUED | OUTPATIENT
Start: 2018-12-09 | End: 2018-12-10

## 2018-12-09 RX ORDER — SODIUM CHLORIDE 9 MG/ML
1000 INJECTION, SOLUTION INTRAVENOUS
Qty: 0 | Refills: 0 | Status: DISCONTINUED | OUTPATIENT
Start: 2018-12-09 | End: 2018-12-10

## 2018-12-09 RX ORDER — SENNA PLUS 8.6 MG/1
2 TABLET ORAL AT BEDTIME
Qty: 0 | Refills: 0 | Status: DISCONTINUED | OUTPATIENT
Start: 2018-12-09 | End: 2018-12-11

## 2018-12-09 RX ORDER — FAMOTIDINE 10 MG/ML
20 INJECTION INTRAVENOUS ONCE
Qty: 0 | Refills: 0 | Status: COMPLETED | OUTPATIENT
Start: 2018-12-09 | End: 2018-12-09

## 2018-12-09 RX ADMIN — Medication 975 MILLIGRAM(S): at 13:16

## 2018-12-09 RX ADMIN — SODIUM CHLORIDE 3 MILLILITER(S): 9 INJECTION INTRAMUSCULAR; INTRAVENOUS; SUBCUTANEOUS at 13:19

## 2018-12-09 RX ADMIN — Medication 325 MILLIGRAM(S): at 13:16

## 2018-12-09 RX ADMIN — Medication 30 MILLIGRAM(S): at 10:00

## 2018-12-09 RX ADMIN — Medication 1 TABLET(S): at 13:16

## 2018-12-09 RX ADMIN — SIMETHICONE 80 MILLIGRAM(S): 80 TABLET, CHEWABLE ORAL at 14:16

## 2018-12-09 RX ADMIN — Medication 30 MILLIGRAM(S): at 02:22

## 2018-12-09 RX ADMIN — Medication 30 MILLIGRAM(S): at 02:40

## 2018-12-09 RX ADMIN — Medication 20 MILLIEQUIVALENT(S): at 22:41

## 2018-12-09 RX ADMIN — FAMOTIDINE 20 MILLIGRAM(S): 10 INJECTION INTRAVENOUS at 17:44

## 2018-12-09 RX ADMIN — Medication 50 GRAM(S): at 21:14

## 2018-12-09 RX ADMIN — Medication 20 MILLIEQUIVALENT(S): at 21:13

## 2018-12-09 RX ADMIN — Medication 50 MILLIGRAM(S): at 17:44

## 2018-12-09 RX ADMIN — Medication 975 MILLIGRAM(S): at 04:13

## 2018-12-09 RX ADMIN — ONDANSETRON 4 MILLIGRAM(S): 8 TABLET, FILM COATED ORAL at 17:44

## 2018-12-09 RX ADMIN — Medication 100 MILLIGRAM(S): at 13:19

## 2018-12-09 RX ADMIN — Medication 50 GRAM(S): at 19:33

## 2018-12-09 NOTE — CONSULT NOTE ADULT - ATTENDING COMMENTS
40 minutes of critical care management as follows  immediately responded to rapid response on OBGYN  severe rash, facial swelling and desaturation.  responded to above therapies including intramuscular epinephrine  tachycardic likely from epinephrine  additional monitoring in the SICU  required additional dose of benadryl for return of rash  coordinating care with MISTY

## 2018-12-09 NOTE — CONSULT NOTE ADULT - SUBJECTIVE AND OBJECTIVE BOX
HISTORY OF PRESENT ILLNESS:  GEOVANNY WARNER is a 38y Female POD 1 from  c/b post partum hemorrhage.  , patient received 2U PRBC, responded appropriately, remained hemodynamically stable.  Today patient had a dose of simethicone and ate a turkey sandwich, shortly after developed a diffuse maculopapular rash, lip and eyelid swelling, and SOB.  RRT called, patient received IV benadryl and solumedrol, IM epinephrine, and nebulized racemic epi and albuterol.  Swelling and rash noted to improve.  Patient initially satting in low 90s, came back up to high 90s-100. Patient has no known allergies and has never had a reaction like this before.  Transferred to the SICU for respiratory monitoring.     PAST MEDICAL HISTORY: gestational DM    CODE STATUS: Full code     HOME MEDICATIONS: insulin, prenatal vitamins    ALLERGIES: No Known Allergies      VITAL SIGNS:  ICU Vital Signs Last 24 Hrs  T(C): 36.9 (09 Dec 2018 13:00), Max: 37.1 (09 Dec 2018 09:20)  T(F): 98.4 (09 Dec 2018 13:00), Max: 98.7 (09 Dec 2018 09:20)  HR: 89 (09 Dec 2018 13:00) (80 - 102)  BP: 131/84 (09 Dec 2018 13:00) (115/59 - 148/69)  BP(mean): 85 (08 Dec 2018 21:30) (85 - 103)  ABP: --  ABP(mean): --  RR: 18 (09 Dec 2018 13:00) (14 - 24)  SpO2: 95% (09 Dec 2018 05:30) (93% - 98%)      NEURO  Exam: AOx3, follows commands   acetaminophen   Tablet .. 975 milliGRAM(s) Oral every 6 hours  diphenhydrAMINE 25 milliGRAM(s) Oral every 6 hours PRN Itching  fentaNYL (3 MICROgram(s)/mL) + BUpivacaine 0.01% in 0.9% Sodium Chloride PCEA 250 milliLiter(s) Epidural PCA Continuous  fentaNYL (3 MICROgram(s)/mL) + BUpivacaine 0.01% in 0.9% Sodium Chloride PCEA Rescue Clinician Bolus 5 milliLiter(s) Epidural every 15 minutes PRN Moderate Pain (4 - 6)  ibuprofen  Tablet. 600 milliGRAM(s) Oral every 6 hours  ketorolac   Injectable 30 milliGRAM(s) IV Push every 6 hours  ondansetron Injectable 4 milliGRAM(s) IV Push every 6 hours PRN Nausea  oxyCODONE    IR 5 milliGRAM(s) Oral every 3 hours  oxyCODONE    IR 5 milliGRAM(s) Oral every 4 hours PRN Severe Pain (7 - 10)      RESPIRATORY  Exam: mild increase WOB, CTAB, on duonebs with improvement in respiratory status      CARDIOVASCULAR  Exam: tachycardic, hypertensive  Cardiac Rhythm: sinus       GI/NUTRITION  Exam: soft, nondistended, incison c,d,i appropriately tender, diffuse rash  Diet: Reg  docusate sodium 100 milliGRAM(s) Oral two times a day PRN Stool Softening  glycerin Suppository - Adult 1 Suppository(s) Rectal at bedtime PRN Constipation  simethicone 80 milliGRAM(s) Chew every 4 hours PRN Gas      GENITOURINARY/RENAL  ferrous    sulfate 325 milliGRAM(s) Oral daily  lactated ringers. 1000 milliLiter(s) IV Continuous <Continuous>  oxytocin Infusion 41.667 milliUNIT(s)/Min IV Continuous <Continuous>  oxytocin Infusion 333.333 milliUNIT(s)/Min IV Continuous <Continuous>  oxytocin Infusion 333.333 milliUNIT(s)/Min IV Continuous <Continuous>  oxytocin Infusion 41.667 milliUNIT(s)/Min IV Continuous <Continuous>  oxytocin Infusion 4 milliUNIT(s)/Min IV Continuous <Continuous>  prenatal multivitamin 1 Tablet(s) Oral daily  sodium chloride 0.9% Bolus 500 milliLiter(s) IV Bolus once  sodium chloride 0.9% lock flush 3 milliLiter(s) IV Push every 8 hours  sodium chloride 0.9%. 1000 milliLiter(s) IV Continuous <Continuous>       @ 07:  -   @ 07:00  --------------------------------------------------------  IN:    oxytocin Infusion: 583 mL    oxytocin Infusion: 1000 mL    Packed Red Blood Cells: 650 mL    Sodium Chloride 0.9% IV Bolus: 2000 mL    sodium chloride 0.9%.: 200 mL    Solution: 100 mL  Total IN: 4533 mL    OUT:    Estimated Blood Loss: 2000 mL    Indwelling Catheter - Urethral: 2975 mL  Total OUT: 4975 mL    Total NET: -442 mL       @ 07:01  -   @ 16:38  --------------------------------------------------------  IN:  Total IN: 0 mL    OUT:    Indwelling Catheter - Urethral: 900 mL    Voided: 300 mL  Total OUT: 1200 mL    Total NET: -1200 mL              140  |  109<H>  |  6<L>  ----------------------------<  108<H>  3.7   |  20<L>  |  0.48<L>    Ca    7.5<L>      08 Dec 2018 13:01    TPro  5.1<L>  /  Alb  2.6<L>  /  TBili  1.3<H>  /  DBili  x   /  AST  14  /  ALT  8<L>  /  AlkPhos  103      [ x] Rivera catheter, indication: urine output monitoring in critically ill patient    HEMATOLOGIC  VTE Prophylaxis: holding in the setting of recent post-partum hemorrhage                           10.7   9.6   )-----------( 135      ( 09 Dec 2018 14:10 )             30.9     PT/INR - ( 09 Dec 2018 08:56 )   PT: 11.8 sec;   INR: 1.03 ratio         PTT - ( 09 Dec 2018 08:56 )  PTT:27.8 sec  Transfusion: [2 ] PRBC	[ ] Platelets	[ ] FFP	[ ] Cryoprecipitate      INFECTIOUS DISEASES  diphtheria/tetanus/pertussis (acellular) Vaccine (ADAcel) 0.5 milliLiter(s) IntraMuscular once        ENDOCRINE: anaphylaxsis 2/2 unknown source, gestational DM  dexamethasone  Injectable 4 milliGRAM(s) IV Push every 6 hours PRN      POCT Blood Glucose.: 123 mg/dL (09 Dec 2018 16:13)      PATIENT CARE ACCESS DEVICES:  [x ] Peripheral IV  [ ] Central Venous Line	[ ] R	[ ] L	[ ] IJ	[ ] Fem	[ ] SC	Placed:   [ ] Arterial Line		[ ] R	[ ] L	[ ] Fem	[ ] Rad	[ ] Ax	Placed:   [ ] PICC:					[ ] Mediport  [x ] Urinary Catheter, Date Placed:   [x] Necessity of urinary, arterial, and venous catheters discussed    OTHER MEDICATIONS: lanolin Ointment 1 Application(s) Topical every 3 hours PRN  naloxone Injectable 0.1 milliGRAM(s) IV Push every 3 minutes PRN HISTORY OF PRESENT ILLNESS:  GEOVANNY WARNER is a 38y Female POD 1 from  c/b post partum hemorrhage.  , patient received 2U PRBC, responded appropriately, remained hemodynamically stable.  Today patient had a dose of simethicone and ate a turkey sandwich, shortly after developed a diffuse maculopapular rash, lip and eyelid swelling, and SOB.  RRT called, patient received 50 IV benadryl and 125 IV solumedrol, .3 IM epinephrine, and nebulized racemic epi and albuterol.  Swelling and rash noted to improve.  Patient initially satting in low 90s, came back up to high 90s-100. Patient has no known allergies and has never had a reaction like this before.  Transferred to the SICU for respiratory monitoring.     PAST MEDICAL HISTORY: gestational DM    CODE STATUS: Full code     HOME MEDICATIONS: insulin, prenatal vitamins    ALLERGIES: No Known Allergies      VITAL SIGNS:  ICU Vital Signs Last 24 Hrs  T(C): 36.9 (09 Dec 2018 13:00), Max: 37.1 (09 Dec 2018 09:20)  T(F): 98.4 (09 Dec 2018 13:00), Max: 98.7 (09 Dec 2018 09:20)  HR: 89 (09 Dec 2018 13:00) (80 - 102)  BP: 131/84 (09 Dec 2018 13:00) (115/59 - 148/69)  BP(mean): 85 (08 Dec 2018 21:30) (85 - 103)  ABP: --  ABP(mean): --  RR: 18 (09 Dec 2018 13:00) (14 - 24)  SpO2: 95% (09 Dec 2018 05:30) (93% - 98%)      NEURO  Exam: AOx3, follows commands   acetaminophen   Tablet .. 975 milliGRAM(s) Oral every 6 hours  diphenhydrAMINE 25 milliGRAM(s) Oral every 6 hours PRN Itching  fentaNYL (3 MICROgram(s)/mL) + BUpivacaine 0.01% in 0.9% Sodium Chloride PCEA 250 milliLiter(s) Epidural PCA Continuous  fentaNYL (3 MICROgram(s)/mL) + BUpivacaine 0.01% in 0.9% Sodium Chloride PCEA Rescue Clinician Bolus 5 milliLiter(s) Epidural every 15 minutes PRN Moderate Pain (4 - 6)  ibuprofen  Tablet. 600 milliGRAM(s) Oral every 6 hours  ketorolac   Injectable 30 milliGRAM(s) IV Push every 6 hours  ondansetron Injectable 4 milliGRAM(s) IV Push every 6 hours PRN Nausea  oxyCODONE    IR 5 milliGRAM(s) Oral every 3 hours  oxyCODONE    IR 5 milliGRAM(s) Oral every 4 hours PRN Severe Pain (7 - 10)      RESPIRATORY  Exam: mild increase WOB, CTAB, on duonebs with improvement in respiratory status      CARDIOVASCULAR  Exam: tachycardic, hypertensive  Cardiac Rhythm: sinus       GI/NUTRITION  Exam: soft, nondistended, incison c,d,i appropriately tender, diffuse rash  Diet: Reg  docusate sodium 100 milliGRAM(s) Oral two times a day PRN Stool Softening  glycerin Suppository - Adult 1 Suppository(s) Rectal at bedtime PRN Constipation  simethicone 80 milliGRAM(s) Chew every 4 hours PRN Gas      GENITOURINARY/RENAL  ferrous    sulfate 325 milliGRAM(s) Oral daily  lactated ringers. 1000 milliLiter(s) IV Continuous <Continuous>  oxytocin Infusion 41.667 milliUNIT(s)/Min IV Continuous <Continuous>  oxytocin Infusion 333.333 milliUNIT(s)/Min IV Continuous <Continuous>  oxytocin Infusion 333.333 milliUNIT(s)/Min IV Continuous <Continuous>  oxytocin Infusion 41.667 milliUNIT(s)/Min IV Continuous <Continuous>  oxytocin Infusion 4 milliUNIT(s)/Min IV Continuous <Continuous>  prenatal multivitamin 1 Tablet(s) Oral daily  sodium chloride 0.9% Bolus 500 milliLiter(s) IV Bolus once  sodium chloride 0.9% lock flush 3 milliLiter(s) IV Push every 8 hours  sodium chloride 0.9%. 1000 milliLiter(s) IV Continuous <Continuous>       @ 07:01  -   @ 07:00  --------------------------------------------------------  IN:    oxytocin Infusion: 583 mL    oxytocin Infusion: 1000 mL    Packed Red Blood Cells: 650 mL    Sodium Chloride 0.9% IV Bolus: 2000 mL    sodium chloride 0.9%.: 200 mL    Solution: 100 mL  Total IN: 4533 mL    OUT:    Estimated Blood Loss: 2000 mL    Indwelling Catheter - Urethral: 2975 mL  Total OUT: 4975 mL    Total NET: -442 mL       @ 07:01  -   @ 16:38  --------------------------------------------------------  IN:  Total IN: 0 mL    OUT:    Indwelling Catheter - Urethral: 900 mL    Voided: 300 mL  Total OUT: 1200 mL    Total NET: -1200 mL              140  |  109<H>  |  6<L>  ----------------------------<  108<H>  3.7   |  20<L>  |  0.48<L>    Ca    7.5<L>      08 Dec 2018 13:01    TPro  5.1<L>  /  Alb  2.6<L>  /  TBili  1.3<H>  /  DBili  x   /  AST  14  /  ALT  8<L>  /  AlkPhos  103      [ x] Rivera catheter, indication: urine output monitoring in critically ill patient    HEMATOLOGIC  VTE Prophylaxis: holding in the setting of recent post-partum hemorrhage                           10.7   9.6   )-----------( 135      ( 09 Dec 2018 14:10 )             30.9     PT/INR - ( 09 Dec 2018 08:56 )   PT: 11.8 sec;   INR: 1.03 ratio         PTT - ( 09 Dec 2018 08:56 )  PTT:27.8 sec  Transfusion: [2 ] PRBC	[ ] Platelets	[ ] FFP	[ ] Cryoprecipitate      INFECTIOUS DISEASES  diphtheria/tetanus/pertussis (acellular) Vaccine (ADAcel) 0.5 milliLiter(s) IntraMuscular once        ENDOCRINE: anaphylaxsis 2/2 unknown source, gestational DM  dexamethasone  Injectable 4 milliGRAM(s) IV Push every 6 hours PRN      POCT Blood Glucose.: 123 mg/dL (09 Dec 2018 16:13)      PATIENT CARE ACCESS DEVICES:  [x ] Peripheral IV  [ ] Central Venous Line	[ ] R	[ ] L	[ ] IJ	[ ] Fem	[ ] SC	Placed:   [ ] Arterial Line		[ ] R	[ ] L	[ ] Fem	[ ] Rad	[ ] Ax	Placed:   [ ] PICC:					[ ] Mediport  [x ] Urinary Catheter, Date Placed:   [x] Necessity of urinary, arterial, and venous catheters discussed    OTHER MEDICATIONS: lanolin Ointment 1 Application(s) Topical every 3 hours PRN  naloxone Injectable 0.1 milliGRAM(s) IV Push every 3 minutes PRN

## 2018-12-09 NOTE — PROGRESS NOTE ADULT - SUBJECTIVE AND OBJECTIVE BOX
OB Attending Note      S: Pt feeling well, +F, pain controlled    Physical exam:    Vital Signs Last 24 Hrs  T(C): 37.1 (09 Dec 2018 09:20), Max: 37.4 (08 Dec 2018 14:25)  T(F): 98.7 (09 Dec 2018 09:20), Max: 99.3 (08 Dec 2018 14:25)  HR: 88 (09 Dec 2018 09:20) (80 - 112)  BP: 127/77 (09 Dec 2018 09:20) (104/51 - 148/69)  BP(mean): 85 (08 Dec 2018 21:30) (74 - 103)  RR: 18 (09 Dec 2018 09:20) (14 - 29)  SpO2: 95% (09 Dec 2018 05:30) (93% - 100%)  I&O's Summary    08 Dec 2018 07:01  -  09 Dec 2018 07:00  --------------------------------------------------------  IN: 4533 mL / OUT: 4975 mL / NET: -442 mL        Gen: NAD  Breast: Soft, nontender, not engorged.  Abdomen: Soft, nontender, no distension , firm uterine fundus at umbilicus.  Incision: Clean, dry, and intact with steri strips  Scant Lochia  Ext: No calf tenderness    LABS:                        10.0   10.0  )-----------( 118      ( 09 Dec 2018 08:56 )             28.6                         9.9    8.4   )-----------( 89       ( 09 Dec 2018 03:54 )             27.7                         10.6   10.3  )-----------( 115      ( 08 Dec 2018 21:36 )             29.6                         10.9   12.1  )-----------( 139      ( 08 Dec 2018 17:39 )             30.7                         10.9   15.6  )-----------( 179      ( 08 Dec 2018 13:01 )             31.2                         12.0   8.4   )-----------( 152      ( 07 Dec 2018 22:01 )             34.2                         13.5   8.6   )-----------( 178      ( 07 Dec 2018 14:09 )             38.3       18 @ 13:01      140  |  109<H>  |  6<L>  ----------------------------<  108<H>  3.7   |  20<L>  |  0.48<L>    18 @ 22:01      137  |  104  |  6<L>  ----------------------------<  114<H>  3.3<L>   |  20<L>  |  0.46<L>    18 @ 14:09      137  |  103  |  7   ----------------------------<  80  3.6   |  22  |  0.45<L>        Ca    7.5<L>      08 Dec 2018 13:01  Ca    8.4      07 Dec 2018 22:01  Ca    9.0      07 Dec 2018 14:09    TPro  5.1<L>  /  Alb  2.6<L>  /  TBili  1.3<H>  /  DBili  x   /  AST  14  /  ALT  8<L>  /  AlkPhos  103  18 @ 13:01  TPro  6.2  /  Alb  3.1<L>  /  TBili  1.2  /  DBili  x   /  AST  14  /  ALT  10  /  AlkPhos  119  18 @ 22:01  TPro  7.0  /  Alb  3.7  /  TBili  1.0  /  DBili  x   /  AST  14  /  ALT  12  /  AlkPhos  136<H>  18 @ 14:09              Assessment and Plan  POD # 1 s/p  section - right salpingectomy/ left partial salpingectomy  Doing well.  Continue Ambulation/OOB/Venodynes/heparin  Cont Pain medications  Regular diet  Circ counseling done, risks and benefits reviewed, Risks include not limited to bleeding, infection, organ damage, permanent cosmetic changes and need for repeat circ

## 2018-12-09 NOTE — CHART NOTE - NSCHARTNOTEFT_GEN_A_CORE
RRT call for anaphylaxis.    Briefly patient is a RRT called for anaphylaxis.    This is a 38 year old woman  who underwent  delivery on 2018 (POD#1) c/b post-partum hemorrhage requiring 2U pRBC transfusion. OB team at bedside who report patient developed shortness of breath and tongue heaviness which was succeeded by diffuse erythematous rash localized over trunk, upper extremities and proximal LE extremities. RRT called for anaphylaxis.    This is a 38 year old woman  who underwent  delivery on 2018 (POD#1) c/b post-partum hemorrhage requiring 2U pRBC transfusion. OB team at bedside who report patient developed shortness of breath and tongue heaviness, followed by lip swelling, difficulty speaking, wheezing and diffuse erythematous rash localized over trunk, UE and proximal LE. Patient given Benadryl IV x1, epinephrine 0.3 IM x2, RRT called for anaphylaxis.    This is a 38 year old woman  who underwent  delivery on 2018 (POD#1) c/b post-partum hemorrhage requiring 2U pRBC transfusion. OB team at bedside who report patient developed shortness of breath and tongue heaviness, followed by lip swelling, difficulty speaking, wheezing and rash.  Patient given Benadryl IV x1, epinephrine 0.3 IM x2, Solu-Medrol 125 IV x1 prior to my arrival. RRT lead by RRT Attending Dr. Prince Ortiz. Anesthesia at bedside. Patient given alburacemic epipnephrine x2. Exam: awake and responsive, bilateral periorbital edema, perioral soft tissue swelling, speaking few words, unable to complete full sentences, diffuse wheeze over upper lung fields, poor airflow to lung bases bilaterally, diffuse erythematous rash localized over trunk, UE and proximal LE. RRT called for anaphylaxis.    This is a 38 year old woman  who underwent  delivery on 2018 (POD#1) c/b post-partum hemorrhage requiring 2U pRBC transfusion. OB team at bedside who report patient developed shortness of breath and tongue heaviness, followed by lip swelling, difficulty speaking, wheezing and rash.  Patient given Benadryl IV x1, epinephrine 0.3 IM x2, Solu-Medrol 125 IV x1 prior to my arrival. RRT lead by Attending Dr. Prince Ortiz. Anesthesia at bedside. Exam: awake and responsive, bilateral periorbital edema, perioral soft tissue swelling, unable to speak in full sentences, diffuse wheezes auscultated over upper lung fields, poor airflow to lung bases bilaterally, confluent erythematous rash localized over trunk, UE and proximal LE. In RRT patient given racemic epinephrine neb x1, albuterol neb x1 with resolution of wheeze and improved airflow throughout lungs. RRT called for anaphylaxis.    This is a 38 year old woman  who underwent  delivery on 2018 (POD#1) c/b post-partum hemorrhage requiring 2U pRBC transfusion. OB team at bedside who report patient developed shortness of breath and tongue heaviness, followed by lip swelling, difficulty speaking, wheezing and rash.  Patient given Benadryl IV x1, epinephrine 0.3 IM x2, Solu-Medrol 125 IV x1 prior to my arrival. RRT lead by Attending Dr. Prince Ortiz. Anesthesia on standby at bedside. Initial vital signs stable except for sinus tachycardia to 140s on monitor. Physical exam: awake and responsive, bilateral periorbital edema, perioral soft tissue swelling, unable to speak in full sentences, diffuse wheezes auscultated over upper lung fields, poor airflow to lung bases bilaterally, confluent erythematous rash localized over trunk, UE and proximal LE. In RRT patient given racemic epinephrine neb x1, albuterol neb x1 with resolution of wheeze and improved airflow throughout lungs. O2 saturation remained stable at 100%, systolic BP maintained in 150s, tachycardia improved to 120s. Patient reports no prior known food/drug allergies. pRBC given over 24 hours ago without adverse reaction. Transfusion reaction would be very delayed and less likely cause of anaphylaxis. Toradol given x2 however patient reports taking NSAIDS in past without reaction. Of note, patient received simethicone approx 1 hour prior to RRT whichis not known to cause anaphylaxis. SICU consulted to evaluate patient for transfer in event of respiratory collapse/decompensation. Accepted to SICU for further monitoring and RRT ended.      Plan:  - Allergy/Immunology evaluation for allergy testing outpatient  - Would hold further simethicone, possibly Toradol  - Respiratory support/hemodynamic monitoring per SICU team  - Further management per OB and SICU team    DINAH Sol PGY3  MAR #[id number] RRT called for anaphylaxis.    This is a 38 year old woman  who underwent  delivery on 2018 (POD#1) c/b post-partum hemorrhage requiring 2U pRBC transfusion. OB team at bedside who report patient developed shortness of breath and tongue heaviness, followed by lip swelling, difficulty speaking, wheezing and rash.  Patient given Benadryl 50 IV x1, epinephrine 0.3 IM x2, Solu-Medrol 125 IV x1 prior to my arrival. RRT lead by Attending Dr. Prince Ortiz. Anesthesia on standby at bedside. Initial vital signs stable except for sinus tachycardia to 140s on monitor. Physical exam: awake and responsive, bilateral periorbital edema, perioral soft tissue swelling, unable to speak in full sentences, diffuse wheezes auscultated over upper lung fields, poor airflow to lung bases bilaterally, confluent erythematous rash localized over trunk, UE and proximal LE. In RRT patient given racemic epinephrine neb x1, albuterol neb x1 with resolution of wheeze and improved airflow throughout lungs. O2 saturation remained stable at 100%, systolic BP maintained in 150s, tachycardia improved to 120s. Patient reports no prior known food/drug allergies. pRBC given over 24 hours ago without adverse reaction. Transfusion reaction would be very delayed and less likely cause of anaphylaxis. Toradol given x2 however patient reports taking NSAIDS in past without reaction. Of note, patient received simethicone approx 1 hour prior to RRT whichis not known to cause anaphylaxis. SICU consulted to evaluate patient for transfer in event of respiratory collapse/decompensation. Accepted to SICU for further monitoring and RRT ended.      Plan:  - Allergy/Immunology evaluation for allergy testing outpatient  - Would hold further simethicone, possibly Toradol  - Respiratory support/hemodynamic monitoring per SICU team  - Further management per OB and SICU team    DINAH Sol PGY3  MAR #[id number] RRT called for anaphylaxis.    This is a 38 year old woman  who underwent  delivery on 2018 (POD#1) c/b post-partum hemorrhage requiring 2U pRBC transfusion. OB team at bedside who report patient developed shortness of breath and tongue heaviness, followed by lip swelling, difficulty speaking, wheezing and rash.  Patient given Benadryl 50 IV x1, epinephrine 0.3 IM x2, Solu-Medrol 125 IV x1 prior to my arrival. RRT lead by Attending Dr. Prince Ortiz. Anesthesia on standby at bedside. Initial vital signs stable except for sinus tachycardia to 140s on monitor. Physical exam: awake and responsive, bilateral periorbital edema, perioral soft tissue swelling, unable to speak in full sentences, diffuse wheezes auscultated over upper lung fields, poor airflow to lung bases bilaterally, confluent erythematous rash localized over trunk, UE and proximal LE. In RRT patient given racemic epinephrine neb x1, albuterol neb x1 with resolution of wheeze and moderately improved airflow. O2 saturation remained stable at 100%, systolic BP maintained in 150s, tachycardia improved to 120s. Patient reports no prior known food/drug allergies. pRBC given over 24 hours ago without adverse reaction. Transfusion reaction would be very delayed and less likely cause of anaphylaxis. Toradol given x2 however patient reports taking NSAIDS in past without reaction. Of note, patient received simethicone approx 1 hour prior to RRT whichis not known to cause anaphylaxis. SICU consulted to evaluate patient for transfer in event of respiratory collapse/decompensation. Accepted to SICU for further monitoring and RRT ended.      Plan:  - Allergy/Immunology evaluation for allergy testing outpatient  - Would hold further simethicone, possibly Toradol  - Respiratory support/hemodynamic monitoring per SICU team  - Further management per OB and SICU team    DINAH Sol PGY3  MAR #[id number] RRT called for anaphylaxis.    This is a 38 year old woman  who underwent  delivery on 2018 (POD#1) c/b post-partum hemorrhage requiring 2U pRBC transfusion. OB team at bedside who report patient developed itching and difficulty breathing followed by and tongue heaviness, difficulty speaking and rash.  Patient given Benadryl 50 IV x1, epinephrine 0.3 IM x2, Solu-Medrol 125 IV x1 prior to my arrival. RRT lead by Attending Dr. Prince Ortiz. Anesthesia on standby at bedside. Initial vital signs stable except for sinus tachycardia to 140s on monitor. Physical exam: awake and responsive, bilateral periorbital edema, perioral soft tissue swelling, unable to speak in full sentences, diffuse wheezes auscultated over upper lung fields, poor airflow to lung bases bilaterally, confluent erythematous rash localized over trunk, UE and proximal LE. In RRT patient given racemic epinephrine neb x1, albuterol neb x1 with resolution of wheeze and moderately improved airflow. O2 saturation remained stable at 100%, systolic BP maintained in 150s, tachycardia improved to 120s. Patient reports no prior known food/drug allergies. pRBC given over 24 hours ago without adverse reaction. Transfusion reaction would be very delayed and less likely cause of anaphylaxis. Toradol given x2 however patient reports taking NSAIDS in past without reaction. Of note, patient received simethicone approx 20 mins prior to RRT whichis not known to cause anaphylaxis. SICU consulted to evaluate patient for transfer in event of respiratory collapse/decompensation. Accepted to SICU for further monitoring and RRT ended.      Plan:  - Allergy/Immunology evaluation for allergy testing outpatient  - Would hold further simethicone, possibly Toradol  - Respiratory support/hemodynamic monitoring per SICU team  - Further management per OB and SICU team    DINAH Sol PGY3  MAR #[id number] RRT called for anaphylaxis.    This is a 38 year old woman  who underwent  delivery on 2018 (POD#1) c/b post-partum hemorrhage requiring 2U pRBC transfusion. OB team at bedside who report patient developed itching and difficulty breathing followed by tongue heaviness, difficulty speaking and rash.  Patient given Benadryl 50 IV x1, epinephrine 0.3 IM x2, Solu-Medrol 125 IV x1 prior to my arrival. RRT lead by Attending Dr. Prince Ortiz. Anesthesia on standby at bedside. Initial vital signs stable except for sinus tachycardia to 140s on monitor. Physical exam: awake and responsive, bilateral periorbital edema, perioral soft tissue swelling, unable to speak in full sentences, diffuse wheezes auscultated over upper lung fields, poor airflow to lung bases bilaterally, confluent erythematous rash localized over trunk, UE and proximal LE. In RRT patient given racemic epinephrine neb x1, albuterol neb x1 with resolution of wheeze and moderately improved airflow. O2 saturation remained stable at 100%, systolic BP maintained in 150s, tachycardia improved to 120s. Patient reports no prior known food/drug allergies. pRBC given over 24 hours ago without adverse reaction. Transfusion reaction would be very delayed and less likely cause of anaphylaxis. Toradol given x2 however patient reports taking NSAIDS in past without reaction. Of note, patient received simethicone approx 20 mins prior to RRT whichis not known to cause anaphylaxis. SICU consulted to evaluate patient for transfer in event of respiratory collapse/decompensation. Accepted to SICU for further monitoring and RRT ended.      Plan:  - Allergy/Immunology evaluation for allergy testing outpatient  - Would hold further simethicone, possibly Toradol  - Respiratory support/hemodynamic monitoring per SICU team  - Further management per OB and SICU team    DINAH Sol PGY3  MAR #[id number] RRT called for anaphylaxis.    This is a 38 year old woman  who underwent  delivery on 2018 (POD#1) c/b post-partum hemorrhage requiring 2U pRBC transfusion. OB team at bedside who report patient developed itching and difficulty breathing followed by tongue heaviness, difficulty speaking and rash.  Patient given Benadryl 50 IV x1, epinephrine 0.3 IM x2, Solu-Medrol 125 IV x1 prior to my arrival. RRT lead by Attending Dr. Prince Ortiz. Anesthesia on standby at bedside. Initial vital signs stable except for sinus tachycardia to 140s on monitor. Physical exam: awake and responsive, bilateral periorbital edema, perioral soft tissue swelling, unable to speak in full sentences, diffuse wheezes auscultated over upper lung fields, poor airflow to lung bases bilaterally, confluent erythematous rash localized over trunk, UE and proximal LE. In RRT patient given racemic epinephrine neb x1, albuterol neb x1 with resolution of wheeze and moderately improved airflow. O2 saturation remained stable at 100%, systolic BP maintained in 150s, tachycardia improved to 120s. Patient reports no known food/drug allergies. pRBC given over 24 hours ago without adverse reaction. Transfusion reaction would be very delayed and less likely cause of anaphylaxis. Toradol given x2 however patient reports taking NSAIDS in past without reaction. Of note, patient received simethicone approx 20 mins prior to RRT whichis not known to cause anaphylaxis. SICU consulted to evaluate patient for transfer in event of respiratory collapse/decompensation. Accepted to SICU for further monitoring and RRT ended.      Plan:  - Allergy/Immunology evaluation for allergy testing outpatient  - Would hold further simethicone, possibly Toradol  - Respiratory support/hemodynamic monitoring per SICU team  - Further management per OB and SICU team    DINAH Sol PGY3  MAR #[id number]

## 2018-12-09 NOTE — CHART NOTE - NSCHARTNOTEFT_GEN_A_CORE
R3 Note    Called to bedside to evaluate pt for c/o generalized pruritus and lip tingling. On arrival, pt was noted to have diffuse full-body rash with swollen lips. Pt noted that she was having difficulty speaking and that her breathing was worsening. Pt denies any such prior episodes and denies any known food or drug allergies. Pt noted to be on PCEA and had received toradol, and IVF earlier that day. Of note, approximately 20 min prior to episode, pt had been given simethicone. Pt had turkey sandwich for lunch an hour earlier. Of note, pt had a PPH of 2L during her pLTCS (12/8) and received 2uPRBCs post-operatively.    VS: BPs noted to be 150s/80-90s; -160s; sat % on RA  On evaluation, pt noted to have a hoarse voice, swollen tongue and lips, b/l wheezing on auscultation. Diffuse full body rash with wheals noted on chest.      at bedside for pt evaluation and management. Anesthesia called to bedside, at bedside during event to ensure airway as necessary. Rapid response called and team arrived promptly.    At bedside, pt received 50IVP of Benadryl, 0.3mg IM epinephrine x 2, 125mg solumedrol, racemic epinephrine and duonebs.     After administration of medication, pt's rash noted to be improving. Her tachycardia improved to 130s and O2 sat continued to be high 90s-100s.    SICU called and arrived at bedside for evaluation. Pt accepted as SICU candidate and transferred for airway monitoring/management.    Differential dx include severe allergic reaction vs transfusion reaction.    Dr. Ellis at bedside for SICU transfer and aware of events.    Jae, pgy3 R3 Note    Called to bedside to evaluate pt for c/o generalized pruritus and lip tingling. On arrival, pt was noted to have diffuse full-body rash with swollen lips. Pt noted that she was having difficulty speaking and that her breathing was worsening. Pt denies any such prior episodes and denies any known food or drug allergies. Pt noted to be on PCEA and had received toradol, and IVF earlier that day. Of note, approximately 20 min prior to episode, pt had been given simethicone. Pt had turkey sandwich for lunch an hour earlier. Of note, pt had a PPH of 2L during her pLTCS (12/8) and received 2uPRBCs post-operatively.    VS: BPs noted to be 150s/80-90s; -160s; sat % on RA  On evaluation, pt noted to have a hoarse voice, swollen tongue and lips, b/l wheezing on auscultation. Diffuse full body rash with wheals noted on chest.      (Dr. Lomax) at bedside for pt evaluation and management. Anesthesia called to bedside, at bedside during event to ensure airway as necessary. Rapid response called and team arrived promptly.    At bedside, pt received 50IVP of Benadryl, 0.3mg IM epinephrine x 2, 125mg solumedrol, racemic epinephrine and duonebs.     After administration of medication, pt's rash noted to be improving. Her tachycardia improved to 130s and O2 sat continued to be high 90s-100s.    SICU called and arrived at bedside for evaluation. Pt accepted as SICU candidate and transferred for airway monitoring/management.    Differential dx include severe allergic reaction vs transfusion reaction.    Dr. Ellis at bedside for SICU transfer and aware of events.    Jae pgy3    Timeline as discussed with [facility] nursing:    15: 45 - pt called out c/o generalized pruritus, lip tingling  15:55 - residents at bedside, benadryl 50 IVP administered  16:00 - anesthesia at bedside, epi 0.3mg IM administered  16:03 - rapid response called  16:05 - rapid response team at bedside; epi 0.3mg IM, solumedrol 125mg administered  16:07 - racemic epi administered; SICU consulted  16:30 - duonebs administered  16:35 - pt accepted to SICU, transferred to SICU    Jae pgy3 R3 Note    Called to bedside to evaluate pt for c/o generalized pruritus and lip tingling. On arrival, pt was noted to have diffuse full-body rash with swollen lips. Pt noted that she was having difficulty speaking and that her breathing was worsening. Pt denies any such prior episodes and denies any known food or drug allergies. Pt noted to be on PCEA and had received toradol, and IVF earlier that day. Of note, approximately 20 min prior to episode, pt had been given simethicone. Pt had turkey sandwich for lunch an hour earlier. Of note, pt had a PPH of 2L during her pLTCS (12/8) and received 2uPRBCs post-operatively.    VS: BPs noted to be 150s/80-90s; -160s; sat % on RA  On evaluation, pt noted to have a hoarse voice, swollen tongue and lips, b/l wheezing on auscultation. Diffuse full body rash with wheals noted on chest.     Safety officers (Dr. Lomax) at bedside for pt evaluation and management. Anesthesia called to bedside, at bedside during event to ensure airway as necessary. Rapid response called and team arrived promptly.    At bedside, pt received 50IVP of Benadryl, 0.3mg IM epinephrine x 2, 125mg solumedrol, racemic epinephrine and duonebs.     After administration of medication, pt's rash noted to be improving. Her tachycardia improved to 130s and O2 sat continued to be high 90s-100s.    SICU called and arrived at bedside for evaluation. Pt accepted as SICU candidate and transferred for airway monitoring/management.  (Dr. Moreno) at bedside as well.    Differential dx include severe allergic reaction vs transfusion reaction.    Dr. Ellis at bedside for SICU transfer and aware of events.    Jae, pgy3    Timeline as discussed with [facility] nursing:    15: 45 - pt called out c/o generalized pruritus, lip tingling  15:55 - residents at bedside, benadryl 50 IVP administered  16:00 - anesthesia at bedside, epi 0.3mg IM administered  16:03 - rapid response called  16:05 - rapid response team at bedside; epi 0.3mg IM, solumedrol 125mg administered  16:07 - racemic epi administered; SICU consulted  16:30 - duonebs administered  16:35 - pt accepted to SICU, transferred to [facility]    Jae, pgy3      Agree with above note. Edits and additions have been made as necessary.     MARI Lomax

## 2018-12-09 NOTE — PROGRESS NOTE ADULT - SUBJECTIVE AND OBJECTIVE BOX
R3 OB Postpartum Note    Patient seen and examined at bedside.  No acute events overnight.  Patient has no complaints and pain is well-controlled.   Patient has not yet ambulated but denies lightheadedness, CP, and SOB at rest.  Patient is tolerating regular diet, passing flatus, has a avila catheter in place.  Postpartum bleeding is well controlled.  She is breast feeding her baby.    Physical exam:    Vital Signs Last 24 Hrs  T(C): 36.6 (09 Dec 2018 04:00), Max: 37.4 (08 Dec 2018 14:25)  T(F): 97.9 (09 Dec 2018 04:00), Max: 99.3 (08 Dec 2018 14:25)  HR: 100 (09 Dec 2018 04:00) (80 - 112)  BP: 122/75 (09 Dec 2018 04:00) (104/51 - 148/69)  BP(mean): 85 (08 Dec 2018 21:30) (74 - 103)  RR: 18 (09 Dec 2018 04:00) (14 - 29)  SpO2: 96% (09 Dec 2018 04:00) (93% - 100%)    12-07 @ 07:01  -  12-08 @ 07:00  --------------------------------------------------------  IN: 2000 mL / OUT: 0 mL / NET: 2000 mL    12-08 @ 07:01  -  12-09 @ 05:51  --------------------------------------------------------  IN: 4533 mL / OUT: 4975 mL / NET: -442 mL        Gen: NAD  Abdomen: Soft, appropriate post-op tenderness, non- distended , firm uterine fundus at umbilicus.  Incision: Clean, dry, and intact with steri strips  Pelvic: Normal lochia noted  Ext: No calf tenderness    LABS:                        9.9    8.4   )-----------( 89       ( 09 Dec 2018 03:54 )             27.7                         10.6   10.3  )-----------( 115      ( 08 Dec 2018 21:36 )             29.6                         10.9   12.1  )-----------( 139      ( 08 Dec 2018 17:39 )             30.7                         10.9   15.6  )-----------( 179      ( 08 Dec 2018 13:01 )             31.2                         12.0   8.4   )-----------( 152      ( 07 Dec 2018 22:01 )             34.2                         13.5   8.6   )-----------( 178      ( 07 Dec 2018 14:09 )             38.3       12-08-18 @ 13:01      140  |  109<H>  |  6<L>  ----------------------------<  108<H>  3.7   |  20<L>  |  0.48<L>    12-07-18 @ 22:01      137  |  104  |  6<L>  ----------------------------<  114<H>  3.3<L>   |  20<L>  |  0.46<L>    12-07-18 @ 14:09      137  |  103  |  7   ----------------------------<  80  3.6   |  22  |  0.45<L>        Ca    7.5<L>      08 Dec 2018 13:01  Ca    8.4      07 Dec 2018 22:01  Ca    9.0      07 Dec 2018 14:09    TPro  5.1<L>  /  Alb  2.6<L>  /  TBili  1.3<H>  /  DBili  x   /  AST  14  /  ALT  8<L>  /  AlkPhos  103  12-08-18 @ 13:01  TPro  6.2  /  Alb  3.1<L>  /  TBili  1.2  /  DBili  x   /  AST  14  /  ALT  10  /  AlkPhos  119  12-07-18 @ 22:01  TPro  7.0  /  Alb  3.7  /  TBili  1.0  /  DBili  x   /  AST  14  /  ALT  12  /  AlkPhos  136<H>  12-07-18 @ 14:09

## 2018-12-09 NOTE — PROGRESS NOTE ADULT - SUBJECTIVE AND OBJECTIVE BOX
Day 1 of Anesthesia Pain Management Service    SUBJECTIVE: I'm okay  Pain Scale Score:    [X] Refer to charted pain scores    THERAPY: Epidural Bupivacaine 0.01 % and Fentanyl 3 micrograms/mL     Demand Dose: 3 mL  Lockout: 15 minutes   Continuous Rate:  10 mL    MEDICATIONS  (STANDING):  acetaminophen   Tablet .. 975 milliGRAM(s) Oral every 6 hours  diphtheria/tetanus/pertussis (acellular) Vaccine (ADAcel) 0.5 milliLiter(s) IntraMuscular once  fentaNYL (3 MICROgram(s)/mL) + BUpivacaine 0.01% in 0.9% Sodium Chloride PCEA 250 milliLiter(s) Epidural PCA Continuous  ferrous    sulfate 325 milliGRAM(s) Oral daily  ibuprofen  Tablet. 600 milliGRAM(s) Oral every 6 hours  ketorolac   Injectable 30 milliGRAM(s) IV Push every 6 hours  lactated ringers. 1000 milliLiter(s) (125 mL/Hr) IV Continuous <Continuous>  oxyCODONE    IR 5 milliGRAM(s) Oral every 3 hours  oxytocin Infusion 41.667 milliUNIT(s)/Min (125 mL/Hr) IV Continuous <Continuous>  oxytocin Infusion 333.333 milliUNIT(s)/Min (1000 mL/Hr) IV Continuous <Continuous>  oxytocin Infusion 333.333 milliUNIT(s)/Min (1000 mL/Hr) IV Continuous <Continuous>  oxytocin Infusion 41.667 milliUNIT(s)/Min (125 mL/Hr) IV Continuous <Continuous>  oxytocin Infusion 4 milliUNIT(s)/Min (4 mL/Hr) IV Continuous <Continuous>  prenatal multivitamin 1 Tablet(s) Oral daily  sodium chloride 0.9% Bolus 500 milliLiter(s) IV Bolus once  sodium chloride 0.9% lock flush 3 milliLiter(s) IV Push every 8 hours  sodium chloride 0.9%. 1000 milliLiter(s) (125 mL/Hr) IV Continuous <Continuous>    MEDICATIONS  (PRN):  dexamethasone  Injectable 4 milliGRAM(s) IV Push every 6 hours PRN Nausea, IF ondansetron is ineffective after 30 - 60 minutes  diphenhydrAMINE 25 milliGRAM(s) Oral every 6 hours PRN Itching  docusate sodium 100 milliGRAM(s) Oral two times a day PRN Stool Softening  fentaNYL (3 MICROgram(s)/mL) + BUpivacaine 0.01% in 0.9% Sodium Chloride PCEA Rescue Clinician Bolus 5 milliLiter(s) Epidural every 15 minutes PRN Moderate Pain (4 - 6)  glycerin Suppository - Adult 1 Suppository(s) Rectal at bedtime PRN Constipation  lanolin Ointment 1 Application(s) Topical every 3 hours PRN Sore Nipples  naloxone Injectable 0.1 milliGRAM(s) IV Push every 3 minutes PRN For ANY of the following changes in patient status:  A. RR LESS THAN 10 breaths per minute, B. Oxygen saturation LESS THAN 90%, C. Sedation score of 6  ondansetron Injectable 4 milliGRAM(s) IV Push every 6 hours PRN Nausea  oxyCODONE    IR 5 milliGRAM(s) Oral every 4 hours PRN Severe Pain (7 - 10)  simethicone 80 milliGRAM(s) Chew every 4 hours PRN Gas      OBJECTIVE:    Assessment of Epidural Catheter Site: 	    [X] Dressing intact	[X] Site non-tender	[X] Site without erythema, discharge, edema  [X] Epidural tubing and connection checked	[X] Gross neurological exam within normal limits  [ ] Catheter removed – tip intact		    PT/INR - ( 09 Dec 2018 03:54 )   PT: 11.9 sec;   INR: 1.03 ratio         PTT - ( 09 Dec 2018 03:54 )  PTT:28.2 sec                      9.9    8.4   )-----------( 89       ( 09 Dec 2018 03:54 )             27.7     Vital Signs Last 24 Hrs  T(C): 36.7 (12-09-18 @ 05:30), Max: 37.4 (12-08-18 @ 14:25)  T(F): 98 (12-09-18 @ 05:30), Max: 99.3 (12-08-18 @ 14:25)  HR: 100 (12-09-18 @ 04:00) (80 - 112)  BP: 122/75 (12-09-18 @ 04:00) (104/51 - 148/69)  BP(mean): 85 (12-08-18 @ 21:30) (74 - 103)  RR: 18 (12-09-18 @ 05:30) (14 - 29)  SpO2: 95% (12-09-18 @ 05:30) (93% - 100%)      Sedation Score:	[X] Alert	[ ] Drowsy	[ ] Arousable  [ ] Asleep  [ ] Unresponsive    Side Effects:	[X] None	[ ] Nausea	[ ] Vomiting  [ ] Pruritus  		[ ] Weakness  [ ] Numbness  [ ] Other:    ASSESSMENT/ PLAN:    Therapy:                         [X] Continue   [ ] Discontinue   [ ] Change to PRN Analgesics    Documentation and Verification of current medications:  [X] Done	[ ] Not done, not eligible, reason:    Comments:

## 2018-12-09 NOTE — CHART NOTE - NSCHARTNOTEFT_GEN_A_CORE
Patient seen in SICU, with Dr. Lomax (OB ).  She continues to feel better. Pt denies chest pain, dyspnea, palpitations, or other acute issues. She is ambulating without assistance, and voiding without issues.  She would like to know when she can go back to the floor to be with her baby.    Vital Signs Last 24 Hrs  T(C): 37.1 (09 Dec 2018 19:30), Max: 37.4 (09 Dec 2018 16:48)  T(F): 98.7 (09 Dec 2018 19:30), Max: 99.4 (09 Dec 2018 16:48)  HR: 125 (09 Dec 2018 19:30) (88 - 130)  BP: 145/74 (09 Dec 2018 19:30) (115/59 - 156/70)  BP(mean): 101 (09 Dec 2018 19:30) (85 - 105)  RR: 30 (09 Dec 2018 19:30) (15 - 33)  SpO2: 98% (09 Dec 2018 19:30) (94% - 99%)    [place]: alert & oriented x4  Diffuse       Dr. Ellis aware  S Ewumi, R3 Patient seen in SICU, with Dr. Lomax (OB ).  She continues to feel better. Pt denies chest pain, dyspnea, palpitations, or other acute issues. She is ambulating without assistance, and voiding without issues.  She would like to know when she can go back to the floor to be with her baby.    Vital Signs Last 24 Hrs  T(C): 37.1 (09 Dec 2018 19:30), Max: 37.4 (09 Dec 2018 16:48)  T(F): 98.7 (09 Dec 2018 19:30), Max: 99.4 (09 Dec 2018 16:48)  HR: 125 (09 Dec 2018 19:30) (88 - 130)  BP: 145/74 (09 Dec 2018 19:30) (115/59 - 156/70)  BP(mean): 101 (09 Dec 2018 19:30) (85 - 105)  RR: 30 (09 Dec 2018 19:30) (15 - 33)  SpO2: 98% (09 Dec 2018 19:30) (94% - 99%)    [place]: alert & oriented x4  Diffuse macular rash on bilateral lower extemities, non-itchy, non-tender. Back and trunk not involved. Slight rash on upper chest and neck.    39yo P3 POD#1 s/p primary low transverse C section and bilateral salpingectomy c/b PPH (EBL 2L) requiring 2upRBC. PP course c/b anaphylactic reaction to unknown substance on POD#1. Pt being monitored in SICU.    Pt is hemodynamically stable with normal O2 sats on room air. Noted to have severe range BPs on cuff, arterial BPs 120s-140s systolic. Pt has no sx of PEC at this time, and recent labs wnl.  Will continue to monitor BPs. If persist, concern for postpartum preeclampsia and pt might require repeat labs and Magnesium for seizure prophylaxis  - continue VS monitoring  - PCEA/toradol for analgesia  - appreciate excellent SICU management  - for breast pump at bedside    Seen with Dr. Dami negron  S Amilcar, R3

## 2018-12-09 NOTE — CONSULT NOTE ADULT - ASSESSMENT
Patient is a 38F POD1  c/b postpartum hemorrhage s/p 2U PRBC >24 hours ago, now with anaphylactic reaction after simethicone and a turkey sandwich.  Rash, lip swelling, and SOB improving    Neuro: post-op pain control  - continue PCEA    Resp:  - continue duonebs  - monitor O2 sat, NC prn to maintain >94%  - close monitoring of resp status    Cards:  - s/p IM epi  - currently hemodynamically stable  - close monitoring for hypotension in setting of anaphylaxis    GI:  - NPO for now, will advance once allergic reaction improves  - NO simethicone  - bowel regimen PRN    :  - avila for close monitoring of UOP  - s/p  with hemorrhage, monitor vaginal bleeding    Heme:  - no DVT ppx at this time  - trend H/H    ID:   - no acute issues    Dispo: SICU for resp monitoring    JERARDO Swift PGY2  [id number]

## 2018-12-09 NOTE — PROGRESS NOTE ADULT - SUBJECTIVE AND OBJECTIVE BOX
OB Attending Note - post time note    was called by in house team at 4:01 and informed that Srikanth was having "an allergic reaction" and the "rapid response team was called"    Upon arrival to the room the patient was talking freely and had received epi/benadryl/salumedrol/racenmic epi/nebulizer treatments  The patient was edemetaous with (per pt - improving) angioedema with a defuse raised prutitic rash.  she was transferred to the SICU at this time - min lochia on the pad - fundus was firm, incision was c/d/i. IV sites intact    when I was in the SICU with Ms. Pappas we had a longer conversation about what happened.  In her words  "she felt itchiness under her tape"  Then she said her voice felt "raspy" and her  Juan Alberto went to get the RN (Many of her family members were in the room at the time who witnessed her quick change)  She then had a diffuse rash and per Juan Alberto, additional support "came quickly to the room"      I had a long conversation with the patient and her family about possible causes of this reaction which include but not limited to the followin. food reaction  2. medicine reaction -- acute v. delayed - the event occurred within a short time from taking mylicon (pt not sure if she ever had this medication before), It is less to be related to any induction agents/antibiotics during the section/pain meds  3. contact or airborne allergens  4. reaction from blood transfusion - though not immediate - patient response did occur within 24hours - pt and family counseled that we recommended blood work to evaluate for this - at this time the cbc results do not indicate change in H/H to suggest hemolysis - additional tests will need to be reviewed   5. reaction - delayed from methylene blue -    Continued emotional support provided for the family.  Encouraged them to call at any time and that I would continue to follow and monitor the patient  I reviewed the main care now is under the guidance of the SICU   De-brief was done by in house team

## 2018-12-10 DIAGNOSIS — T78.2XXD ANAPHYLACTIC SHOCK, UNSPECIFIED, SUBSEQUENT ENCOUNTER: ICD-10-CM

## 2018-12-10 LAB
ALBUMIN SERPL ELPH-MCNC: 2.5 G/DL — LOW (ref 3.3–5)
ALP SERPL-CCNC: 82 U/L — SIGNIFICANT CHANGE UP (ref 40–120)
ALT FLD-CCNC: 9 U/L — LOW (ref 10–45)
ANION GAP SERPL CALC-SCNC: 9 MMOL/L — SIGNIFICANT CHANGE UP (ref 5–17)
APTT BLD: 27.3 SEC — LOW (ref 27.5–36.3)
AST SERPL-CCNC: 17 U/L — SIGNIFICANT CHANGE UP (ref 10–40)
BILIRUB DIRECT SERPL-MCNC: <0.1 MG/DL — SIGNIFICANT CHANGE UP (ref 0–0.2)
BILIRUB INDIRECT FLD-MCNC: >0.3 MG/DL — SIGNIFICANT CHANGE UP (ref 0.2–1)
BILIRUB SERPL-MCNC: 0.4 MG/DL — SIGNIFICANT CHANGE UP (ref 0.2–1.2)
BUN SERPL-MCNC: 7 MG/DL — SIGNIFICANT CHANGE UP (ref 7–23)
CALCIUM SERPL-MCNC: 7.7 MG/DL — LOW (ref 8.4–10.5)
CHLORIDE SERPL-SCNC: 107 MMOL/L — SIGNIFICANT CHANGE UP (ref 96–108)
CO2 SERPL-SCNC: 22 MMOL/L — SIGNIFICANT CHANGE UP (ref 22–31)
CREAT SERPL-MCNC: 0.46 MG/DL — LOW (ref 0.5–1.3)
FIBRINOGEN PPP-MCNC: 908 MG/DL — SIGNIFICANT CHANGE UP (ref 350–510)
GLUCOSE SERPL-MCNC: 152 MG/DL — HIGH (ref 70–99)
HAPTOGLOB SERPL-MCNC: 131 MG/DL — SIGNIFICANT CHANGE UP (ref 34–200)
HCT VFR BLD CALC: 29.3 % — LOW (ref 34.5–45)
HGB BLD-MCNC: 10.6 G/DL — LOW (ref 11.5–15.5)
INR BLD: 1.07 RATIO — SIGNIFICANT CHANGE UP (ref 0.88–1.16)
LDH SERPL L TO P-CCNC: 230 U/L — SIGNIFICANT CHANGE UP (ref 50–242)
MAGNESIUM SERPL-MCNC: 2 MG/DL — SIGNIFICANT CHANGE UP (ref 1.6–2.6)
MCHC RBC-ENTMCNC: 31.5 PG — SIGNIFICANT CHANGE UP (ref 27–34)
MCHC RBC-ENTMCNC: 36.2 GM/DL — HIGH (ref 32–36)
MCV RBC AUTO: 86.9 FL — SIGNIFICANT CHANGE UP (ref 80–100)
PHOSPHATE SERPL-MCNC: 3.1 MG/DL — SIGNIFICANT CHANGE UP (ref 2.5–4.5)
PLATELET # BLD AUTO: 167 K/UL — SIGNIFICANT CHANGE UP (ref 150–400)
POTASSIUM SERPL-MCNC: 4.2 MMOL/L — SIGNIFICANT CHANGE UP (ref 3.5–5.3)
POTASSIUM SERPL-SCNC: 4.2 MMOL/L — SIGNIFICANT CHANGE UP (ref 3.5–5.3)
PROT SERPL-MCNC: 5 G/DL — LOW (ref 6–8.3)
PROTHROM AB SERPL-ACNC: 12.2 SEC — SIGNIFICANT CHANGE UP (ref 10–12.9)
RBC # BLD: 3.37 M/UL — LOW (ref 3.8–5.2)
RBC # FLD: 13 % — SIGNIFICANT CHANGE UP (ref 10.3–14.5)
SODIUM SERPL-SCNC: 138 MMOL/L — SIGNIFICANT CHANGE UP (ref 135–145)
WBC # BLD: 10.3 K/UL — SIGNIFICANT CHANGE UP (ref 3.8–10.5)
WBC # FLD AUTO: 10.3 K/UL — SIGNIFICANT CHANGE UP (ref 3.8–10.5)

## 2018-12-10 PROCEDURE — 99233 SBSQ HOSP IP/OBS HIGH 50: CPT

## 2018-12-10 RX ORDER — OXYCODONE HYDROCHLORIDE 5 MG/1
10 TABLET ORAL EVERY 6 HOURS
Qty: 0 | Refills: 0 | Status: DISCONTINUED | OUTPATIENT
Start: 2018-12-10 | End: 2018-12-11

## 2018-12-10 RX ORDER — ENOXAPARIN SODIUM 100 MG/ML
40 INJECTION SUBCUTANEOUS DAILY
Qty: 0 | Refills: 0 | Status: DISCONTINUED | OUTPATIENT
Start: 2018-12-10 | End: 2018-12-11

## 2018-12-10 RX ORDER — OXYCODONE HYDROCHLORIDE 5 MG/1
5 TABLET ORAL EVERY 6 HOURS
Qty: 0 | Refills: 0 | Status: DISCONTINUED | OUTPATIENT
Start: 2018-12-10 | End: 2018-12-11

## 2018-12-10 RX ADMIN — Medication 325 MILLIGRAM(S): at 12:05

## 2018-12-10 RX ADMIN — Medication 975 MILLIGRAM(S): at 12:05

## 2018-12-10 RX ADMIN — OXYCODONE HYDROCHLORIDE 5 MILLIGRAM(S): 5 TABLET ORAL at 15:02

## 2018-12-10 RX ADMIN — Medication 20 MILLIEQUIVALENT(S): at 00:05

## 2018-12-10 RX ADMIN — Medication 975 MILLIGRAM(S): at 00:07

## 2018-12-10 RX ADMIN — CHLORHEXIDINE GLUCONATE 1 APPLICATION(S): 213 SOLUTION TOPICAL at 06:06

## 2018-12-10 RX ADMIN — Medication 1 TABLET(S): at 15:06

## 2018-12-10 RX ADMIN — OXYCODONE HYDROCHLORIDE 5 MILLIGRAM(S): 5 TABLET ORAL at 21:49

## 2018-12-10 RX ADMIN — ENOXAPARIN SODIUM 40 MILLIGRAM(S): 100 INJECTION SUBCUTANEOUS at 12:05

## 2018-12-10 RX ADMIN — OXYCODONE HYDROCHLORIDE 5 MILLIGRAM(S): 5 TABLET ORAL at 15:57

## 2018-12-10 RX ADMIN — Medication 975 MILLIGRAM(S): at 06:07

## 2018-12-10 RX ADMIN — SENNA PLUS 2 TABLET(S): 8.6 TABLET ORAL at 21:49

## 2018-12-10 RX ADMIN — OXYCODONE HYDROCHLORIDE 5 MILLIGRAM(S): 5 TABLET ORAL at 22:15

## 2018-12-10 NOTE — PROGRESS NOTE ADULT - SUBJECTIVE AND OBJECTIVE BOX
Gyn ONC Progress Note POD #    Subjective:   Pt seen and examined at bedside. No events overnight. Pain well controlled. Patient ambulating. Passing flatus. Tolerating regular diet. Pt denies fever, chills, chest pain, SOB, nausea, vomiting, lightheadedness, dizziness.      Objective:  T(F): 98.7 (12-10-18 @ 03:00), Max: 99.4 (12-09-18 @ 16:48)  HR: 81 (12-10-18 @ 05:00) (81 - 131)  BP: 118/66 (12-10-18 @ 05:00) (111/62 - 168/76)  RR: 14 (12-10-18 @ 05:00) (14 - 34)  SpO2: 98% (12-10-18 @ 05:00) (95% - 99%)  Wt(kg): --  I&O's Summary    08 Dec 2018 07:01  -  09 Dec 2018 07:00  --------------------------------------------------------  IN: 4533 mL / OUT: 4975 mL / NET: -442 mL    09 Dec 2018 07:01  -  10 Dec 2018 06:12  --------------------------------------------------------  IN: 1200 mL / OUT: 1650 mL / NET: -450 mL      CAPILLARY BLOOD GLUCOSE      POCT Blood Glucose.: 123 mg/dL (09 Dec 2018 16:13)      MEDICATIONS  (STANDING):  acetaminophen   Tablet .. 975 milliGRAM(s) Oral every 6 hours  chlorhexidine 4% Liquid 1 Application(s) Topical <User Schedule>  diphtheria/tetanus/pertussis (acellular) Vaccine (ADAcel) 0.5 milliLiter(s) IntraMuscular once  docusate sodium 100 milliGRAM(s) Oral three times a day  fentaNYL (3 MICROgram(s)/mL) + BUpivacaine 0.01% in 0.9% Sodium Chloride PCEA 250 milliLiter(s) Epidural PCA Continuous  ferrous    sulfate 325 milliGRAM(s) Oral daily  hydrocortisone 2.5% Lotion 1 Application(s) Topical two times a day  lactated ringers. 1000 milliLiter(s) (100 mL/Hr) IV Continuous <Continuous>  prenatal multivitamin 1 Tablet(s) Oral daily  senna 2 Tablet(s) Oral at bedtime    MEDICATIONS  (PRN):  dexamethasone  Injectable 4 milliGRAM(s) IV Push every 6 hours PRN Nausea, IF ondansetron is ineffective after 30 - 60 minutes  fentaNYL (3 MICROgram(s)/mL) + BUpivacaine 0.01% in 0.9% Sodium Chloride PCEA Rescue Clinician Bolus 5 milliLiter(s) Epidural every 15 minutes PRN Moderate Pain (4 - 6)  glycerin Suppository - Adult 1 Suppository(s) Rectal at bedtime PRN Constipation  lanolin Ointment 1 Application(s) Topical every 3 hours PRN Sore Nipples  naloxone Injectable 0.1 milliGRAM(s) IV Push every 3 minutes PRN For ANY of the following changes in patient status:  A. RR LESS THAN 10 breaths per minute, B. Oxygen saturation LESS THAN 90%, C. Sedation score of 6  ondansetron Injectable 4 milliGRAM(s) IV Push every 6 hours PRN Nausea      Physical Exam:  Constitutional: NAD, A+O x3  CV: RRR  Lungs: clear to auscultation bilaterally  Abdomen: soft, nondistended, no guarding, no rebound, normal bowel sounds  Incision: clean, dry, intact  Extremities: no lower extremity edema or calf tenderness bilaterally; venodynes in place    LABS:    12-10    138    |  107    |  7      ----------------------------<  152<H>  4.2     |  22     |  0.46<L>  12-09    141    |  108    |  8      ----------------------------<  175<H>  3.5     |  20<L>  |  0.54     Ca    7.7<L>      10 Dec 2018 02:32  Ca    7.9<L>      09 Dec 2018 17:28  Phos  3.1       12-10  Phos  2.8       12-09  Mg     2.0       12-10  Mg     1.5       12-09    TPro  5.0<L>  /  Alb  2.5<L>  /  TBili  0.4    /  DBili  <0.1   /  AST  17     /  ALT  9<L>   /  AlkPhos  82     12-10  TPro  5.1<L>  /  Alb  2.3<L>  /  TBili  0.7    /  DBili  0.2    /  AST  22     /  ALT  11     /  AlkPhos  96     12-09        PT/INR - ( 10 Dec 2018 02:32 )   PT: 12.2 sec;   INR: 1.07 ratio         PTT - ( 10 Dec 2018 02:32 )  PTT:27.3 sec      Assessment/Plan: 38y female POD# , s/p     CV: Hemodynamically stable  Pulm: Saturating well on RA. Increase incentive spirometry.  GI: Advance diet as tolerated  : Rivera in place. Adequate UOP  Heme: Continue HSQ/Lovenox/Venodynes for DVT ppx. Increase OOB.    Neuro: PCA for pain control. // Continue oral meds for pain control. OB PROGRESS NOTE  POD#2    Subjective:   Pt seen and examined sitting in chair. She has no complaints. She denies chest pain, dyspnea, palpitations, lightheadedness or dizziness. She is tolerating regular diet and ambulating without assistance. Patient is voiding without issues and pain is well controlled on PCEA/Tylenol. Vaginal bleeding is light    Objective:  T(F): 98.7 (12-10-18 @ 03:00), Max: 99.4 (12-09-18 @ 16:48)  HR: 81 (12-10-18 @ 05:00) (81 - 131)  BP: 118/66 (12-10-18 @ 05:00) (111/62 - 168/76)  RR: 14 (12-10-18 @ 05:00) (14 - 34)  SpO2: 98% (12-10-18 @ 05:00) (95% - 99%)  I&O's Summary    08 Dec 2018 07:01  -  09 Dec 2018 07:00  --------------------------------------------------------  IN: 4533 mL / OUT: 4975 mL / NET: -442 mL    09 Dec 2018 07:01  -  10 Dec 2018 06:12  --------------------------------------------------------  IN: 1200 mL / OUT: 1650 mL / NET: -450 mL      CAPILLARY BLOOD GLUCOSE    POCT Blood Glucose.: 123 mg/dL (09 Dec 2018 16:13)      MEDICATIONS  (STANDING):  acetaminophen   Tablet .. 975 milliGRAM(s) Oral every 6 hours  chlorhexidine 4% Liquid 1 Application(s) Topical <User Schedule>  diphtheria/tetanus/pertussis (acellular) Vaccine (ADAcel) 0.5 milliLiter(s) IntraMuscular once  docusate sodium 100 milliGRAM(s) Oral three times a day  fentaNYL (3 MICROgram(s)/mL) + BUpivacaine 0.01% in 0.9% Sodium Chloride PCEA 250 milliLiter(s) Epidural PCA Continuous  ferrous    sulfate 325 milliGRAM(s) Oral daily  hydrocortisone 2.5% Lotion 1 Application(s) Topical two times a day  lactated ringers. 1000 milliLiter(s) (100 mL/Hr) IV Continuous <Continuous>  prenatal multivitamin 1 Tablet(s) Oral daily  senna 2 Tablet(s) Oral at bedtime    MEDICATIONS  (PRN):  dexamethasone  Injectable 4 milliGRAM(s) IV Push every 6 hours PRN Nausea, IF ondansetron is ineffective after 30 - 60 minutes  fentaNYL (3 MICROgram(s)/mL) + BUpivacaine 0.01% in 0.9% Sodium Chloride PCEA Rescue Clinician Bolus 5 milliLiter(s) Epidural every 15 minutes PRN Moderate Pain (4 - 6)  glycerin Suppository - Adult 1 Suppository(s) Rectal at bedtime PRN Constipation  lanolin Ointment 1 Application(s) Topical every 3 hours PRN Sore Nipples  naloxone Injectable 0.1 milliGRAM(s) IV Push every 3 minutes PRN For ANY of the following changes in patient status:  A. RR LESS THAN 10 breaths per minute, B. Oxygen saturation LESS THAN 90%, C. Sedation score of 6  ondansetron Injectable 4 milliGRAM(s) IV Push every 6 hours PRN Nausea      Physical Exam:  Constitutional: no acute distress  CV: RRR  Lungs: clear to auscultation bilaterally  Abdomen: soft, nontender, no fundal tenderness  Incision: Pfannenstiel with steri strips clean/dry/intact  Extremities: no erythema, no rash on bilateral upper/lower extremities. No calf tenderness bilaterally    LABS:                        10.6   10.3  )-----------( 167      ( 10 Dec 2018 02:32 )             29.3       12-10    138    |  107    |  7      ----------------------------<  152<H>  4.2     |  22     |  0.46<L>  12-09    141    |  108    |  8      ----------------------------<  175<H>  3.5     |  20<L>  |  0.54     Ca    7.7<L>      10 Dec 2018 02:32  Ca    7.9<L>      09 Dec 2018 17:28  Phos  3.1       12-10  Phos  2.8       12-09  Mg     2.0       12-10  Mg     1.5       12-09    TPro  5.0<L>  /  Alb  2.5<L>  /  TBili  0.4    /  DBili  <0.1   /  AST  17     /  ALT  9<L>   /  AlkPhos  82     12-10  TPro  5.1<L>  /  Alb  2.3<L>  /  TBili  0.7    /  DBili  0.2    /  AST  22     /  ALT  11     /  AlkPhos  96     12-09        PT/INR - ( 10 Dec 2018 02:32 )   PT: 12.2 sec;   INR: 1.07 ratio         PTT - ( 10 Dec 2018 02:32 )  PTT:27.3 sec

## 2018-12-10 NOTE — CHART NOTE - NSCHARTNOTEFT_GEN_A_CORE
Patient seen again at bedside with Dr. Lomax (). She has no complaints. Pump is at bedside.    Vital Signs Last 24 Hrs  T(C): 36.7 (09 Dec 2018 23:00), Max: 37.4 (09 Dec 2018 16:48)  T(F): 98 (09 Dec 2018 23:00), Max: 99.4 (09 Dec 2018 16:48)  HR: 95 (10 Dec 2018 02:00) (88 - 131)  BP: 124/74 (10 Dec 2018 02:00) (122/75 - 168/76)  BP(mean): 94 (10 Dec 2018 02:00) (89 - 120)  RR: 25 (10 Dec 2018 02:00) (15 - 34)  SpO2: 96% (10 Dec 2018 02:00) (95% - 99%)    37yo P3 POD#2 s/p primary low transverse C section and bilateral salpingectomy c/b PPH (EBL 2L) requiring 2upRBC. PP course c/b anaphylactic reaction to unknown substance on POD#1. Pt being monitored in SICU.  Patient is hemodynamically stable, tachycardia resolving, BPs wnl, O2 sats normal on room air  Pt is overall improving, no acute changes.  - continue care per SICU    S Ewumi, R3

## 2018-12-10 NOTE — PROGRESS NOTE ADULT - SUBJECTIVE AND OBJECTIVE BOX
HISTORY  38y Female POD 1 from  c/b post partum hemorrhage.  , patient received 2U PRBC, responded appropriately, remained hemodynamically stable.  patient had a dose of simethicone and ate a turkey sandwich, shortly after developed a diffuse maculopapular rash, lip and eyelid swelling, and SOB.  RRT called, patient received 50 IV benadryl and 125 IV solumedrol, .3 IM epinephrine, and nebulized racemic epi and albuterol.  Swelling and rash noted to improve.  Patient initially satting in low 90s, came back up to high 90s-100. Patient has no known allergies and has never had a reaction like this before.  Transferred to the SICU for respiratory monitoring.     24 HOUR EVENTS:  - transferred to the SICU, respiratory monitoring, no difficulty breathing has been seen in the SICU  - famotidine 20mg, hydrocortisone cream, benadryl 50mg was given in the SICU  - initially came with tachycardia, likely post IM epi, then resolved    SUBJECTIVE/ROS:  [ ] A ten-point review of systems was otherwise negative except as noted.  [ ] Due to altered mental status/intubation, subjective information were not able to be obtained from the patient. History was obtained, to the extent possible, from review of the chart and collateral sources of information.    NEURO  Exam: AOx3, follows commands  Meds: acetaminophen   Tablet .. 975 milliGRAM(s) Oral every 6 hours  fentaNYL (3 MICROgram(s)/mL) + BUpivacaine 0.01% in 0.9% Sodium Chloride PCEA 250 milliLiter(s) Epidural PCA Continuous  fentaNYL (3 MICROgram(s)/mL) + BUpivacaine 0.01% in 0.9% Sodium Chloride PCEA Rescue Clinician Bolus 5 milliLiter(s) Epidural every 15 minutes PRN Moderate Pain (4 - 6)  ondansetron Injectable 4 milliGRAM(s) IV Push every 6 hours PRN Nausea    [x] Adequacy of sedation and pain control has been assessed and adjusted    RESPIRATORY  RR: 28 (12-10-18 @ 00:00) (15 - 34)  SpO2: 97% (12-10-18 @ 00:00) (95% - 99%)  Exam: unlabored, clear to auscultation bilaterally, no difficulty breathing    [N/A] Extubation Readiness Assessed  Meds:     CARDIOVASCULAR  HR: 102 (12-10-18 @ 00:00) (88 - 131)  BP: 133/64 (12-10-18 @ 00:00) (122/75 - 168/76)  BP(mean): 89 (12-10-18 @ 00:00) (89 - 120)    Exam: regular rate and rhythm  Cardiac Rhythm: sinus  Perfusion     [x]Adequate   [ ]Inadequate  Mentation   [x]Normal       [ ]Reduced  Extremities  [x]Warm         [ ]Cool  Volume Status [ ]Hypervolemic [x]Euvolemic [ ]Hypovolemic  Meds:     GI/NUTRITION  Exam: soft, nontender, nondistended, incision C/D/I  Diet: regular diet  Meds: docusate sodium 100 milliGRAM(s) Oral three times a day  glycerin Suppository - Adult 1 Suppository(s) Rectal at bedtime PRN Constipation  senna 2 Tablet(s) Oral at bedtime    GENITOURINARY  I&O's Detail     @ 07:  -   @ 07:00  --------------------------------------------------------  IN:    oxytocin Infusion: 583 mL    oxytocin Infusion: 1000 mL    Packed Red Blood Cells: 650 mL    sodium chloride 0.9%: 200 mL    Sodium Chloride 0.9% IV Bolus: 2000 mL    Solution: 100 mL  Total IN: 4533 mL    OUT:    Estimated Blood Loss: 2000 mL    Indwelling Catheter - Urethral: 2975 mL  Total OUT: 4975 mL    Total NET: -442 mL       @ 07:  -  12-10 @ 00:58  --------------------------------------------------------  IN:    IV PiggyBack: 100 mL    lactated ringers.: 600 mL  Total IN: 700 mL    OUT:    Indwelling Catheter - Urethral: 900 mL    Voided: 750 mL  Total OUT: 1650 mL    Total NET: -950 mL        141  |  108  |  8   ----------------------------<  175<H>  3.5   |  20<L>  |  0.54    Ca    7.9<L>      09 Dec 2018 17:28  Phos  2.8       Mg     1.5         TPro  5.1<L>  /  Alb  2.3<L>  /  TBili  0.7  /  DBili  0.2  /  AST  22  /  ALT  11  /  AlkPhos  96      [x] Rivera catheter, indication: urine output monitoring in critically ill patient  Meds: ferrous    sulfate 325 milliGRAM(s) Oral daily  lactated ringers. 1000 milliLiter(s) IV Continuous <Continuous>  prenatal multivitamin 1 Tablet(s) Oral daily    HEMATOLOGIC  Meds:   VTE Prophylaxis: holding in the setting of recent post-partum hemorrhage                         11.7   11.8  )-----------( 149      ( 09 Dec 2018 17:28 )             33.7     PT/INR - ( 09 Dec 2018 17:28 )   PT: 11.6 sec;   INR: 1.02 ratio      PTT - ( 09 Dec 2018 17:28 )  PTT:28.4 secOTHER MEDICATIONS:  chlorhexidine 4% Liquid 1 Application(s) Topical <User Schedule>  hydrocortisone 2.5% Lotion 1 Application(s) Topical two times a day  lanolin Ointment 1 Application(s) Topical every 3 hours PRN  naloxone Injectable 0.1 milliGRAM(s) IV Push every 3 minutes PRN    INFECTIOUS DISEASES  WBC Count: 11.8 K/uL ( @ 17:28)  WBC Count: 9.6 K/uL ( @ 14:10)  WBC Count: 10.0 K/uL ( @ 08:56)  WBC Count: 8.4 K/uL ( @ 03:54)  diphtheria/tetanus/pertussis (acellular) Vaccine (ADAcel) 0.5 milliLiter(s) IntraMuscular once    ENDOCRINE  anaphylaxsis 2/2 unknown source, gestational DM  POCT Blood Glucose.: 123 mg/dL (09 Dec 2018 16:13)  Meds: dexamethasone  Injectable 4 milliGRAM(s) IV Push every 6 hours PRN    ACCESS DEVICES:  [x] Peripheral IV  [ ] Central Venous Line	[ ] R	[ ] L	[ ] IJ	[ ] Fem	[ ] SC	Placed:   [ ] Arterial Line		[ ] R	[ ] L	[ ] Fem	[ ] Rad	[ ] Ax	Placed:   [ ] PICC:					[ ] Mediport  [x] Urinary Catheter, Date Placed:   [x] Necessity of urinary, arterial, and venous catheters discussed    OTHER MEDICATIONS: lanolin Ointment 1 Application(s) Topical every 3 hours PRN  naloxone Injectable 0.1 milliGRAM(s) IV Push every 3 minutes PRN    CODE STATUS: full code    IMAGING:

## 2018-12-10 NOTE — CHART NOTE - NSCHARTNOTEFT_GEN_A_CORE
SICU Progress Note    I Spoke with Allergy/Immunology fellow at [facility], regarding patient's anaphylaxis reaction to possible simethicone. They stated that they would not be able to complete inpatient workout, due to need to perform testing 4-6 weeks following initial event. Patient may be set up for out patient followup:    [facility] Division of Allergy/Immunology  [address] [place] [address] [id number], [place] [address]  Phone:  [phone]

## 2018-12-10 NOTE — PROGRESS NOTE ADULT - SUBJECTIVE AND OBJECTIVE BOX
Pain Management Attending Addendum    SUBJECTIVE: epidural d/c'd    Therapy:	  [ ] IV PCA	   [x ] Epidural           [ ] s/p Spinal Opoid              [ ] Postpartum infusion	  [ ] Patient controlled regional anesthesia (PCRA)    [ ] prn Analgesics    OBJECTIVE:   [x ] No new signs     [ ] Other:    Side Effects:  [x ] None			[ ] Other:    Assessment of Catheter Site:		[ x] Intact		[ ] Other:    ASSESSMENT/PLAN  [ ] Continue current therapy    [x ] Therapy changed to:    [ ] IV PCA       [ ] Epidural     [x ] prn Analgesics     [ ] post partum infusion    Comments:

## 2018-12-10 NOTE — PROGRESS NOTE ADULT - SUBJECTIVE AND OBJECTIVE BOX
Day 2 of Anesthesia Pain Management Service    SUBJECTIVE: I'm okay  Pain Scale Score:    [X] Refer to charted pain scores    THERAPY: Epidural Bupivacaine 0.01 % and Fentanyl 3 micrograms/mL     Demand Dose: 3 mL  Lockout: 15 minutes   Continuous Rate:  10 mL    MEDICATIONS  (STANDING):  acetaminophen   Tablet .. 975 milliGRAM(s) Oral every 6 hours  chlorhexidine 4% Liquid 1 Application(s) Topical <User Schedule>  diphtheria/tetanus/pertussis (acellular) Vaccine (ADAcel) 0.5 milliLiter(s) IntraMuscular once  docusate sodium 100 milliGRAM(s) Oral three times a day  ferrous    sulfate 325 milliGRAM(s) Oral daily  hydrocortisone 2.5% Lotion 1 Application(s) Topical two times a day  lactated ringers. 1000 milliLiter(s) (100 mL/Hr) IV Continuous <Continuous>  prenatal multivitamin 1 Tablet(s) Oral daily  senna 2 Tablet(s) Oral at bedtime    MEDICATIONS  (PRN):  dexamethasone  Injectable 4 milliGRAM(s) IV Push every 6 hours PRN Nausea, IF ondansetron is ineffective after 30 - 60 minutes  glycerin Suppository - Adult 1 Suppository(s) Rectal at bedtime PRN Constipation  lanolin Ointment 1 Application(s) Topical every 3 hours PRN Sore Nipples  ondansetron Injectable 4 milliGRAM(s) IV Push every 6 hours PRN Nausea  oxyCODONE    IR 5 milliGRAM(s) Oral every 6 hours PRN Moderate Pain (4 - 6)  oxyCODONE    IR 10 milliGRAM(s) Oral every 6 hours PRN Severe Pain (7 - 10)      OBJECTIVE:    Assessment of Epidural Catheter Site: 	    [ ] Dressing intact	[X] Site non-tender	[X] Site without erythema, discharge, edema  [ ] Epidural tubing and connection checked	[X] Gross neurological exam within normal limits  [X] Catheter removed    PT/INR - ( 10 Dec 2018 02:32 )   PT: 12.2 sec;   INR: 1.07 ratio         PTT - ( 10 Dec 2018 02:32 )  PTT:27.3 sec                      10.6   10.3  )-----------( 167      ( 10 Dec 2018 02:32 )             29.3     Vital Signs Last 24 Hrs  T(C): 36.8 (12-10-18 @ 07:00), Max: 37.4 (12-09-18 @ 16:48)  T(F): 98.2 (12-10-18 @ 07:00), Max: 99.4 (12-09-18 @ 16:48)  HR: 91 (12-10-18 @ 09:00) (81 - 131)  BP: 135/80 (12-10-18 @ 09:00) (111/62 - 168/76)  BP(mean): 102 (12-10-18 @ 09:00) (80 - 120)  RR: 23 (12-10-18 @ 09:00) (14 - 51)  SpO2: 99% (12-10-18 @ 09:00) (95% - 100%)      Sedation Score:	[X] Alert	[ ] Drowsy	[ ] Arousable  [ ] Asleep  [ ] Unresponsive    Side Effects:	[X] None	[ ] Nausea	[ ] Vomiting  [ ] Pruritus  		[ ] Weakness  [ ] Numbness  [ ] Other:    ASSESSMENT/ PLAN:    Therapy:                         [ ] Continue   [X] Discontinue   [X] Change to PRN Analgesics    Documentation and Verification of current medications:  [X] Done	[ ] Not done, not eligible, reason:    Comments:

## 2018-12-11 ENCOUNTER — TRANSCRIPTION ENCOUNTER (OUTPATIENT)
Age: 38
End: 2018-12-11

## 2018-12-11 VITALS
DIASTOLIC BLOOD PRESSURE: 83 MMHG | TEMPERATURE: 99 F | RESPIRATION RATE: 18 BRPM | HEART RATE: 114 BPM | OXYGEN SATURATION: 98 % | SYSTOLIC BLOOD PRESSURE: 139 MMHG

## 2018-12-11 LAB
ANION GAP SERPL CALC-SCNC: 13 MMOL/L — SIGNIFICANT CHANGE UP (ref 5–17)
BUN SERPL-MCNC: 11 MG/DL — SIGNIFICANT CHANGE UP (ref 7–23)
CALCIUM SERPL-MCNC: 8 MG/DL — LOW (ref 8.4–10.5)
CHLORIDE SERPL-SCNC: 105 MMOL/L — SIGNIFICANT CHANGE UP (ref 96–108)
CO2 SERPL-SCNC: 22 MMOL/L — SIGNIFICANT CHANGE UP (ref 22–31)
CREAT SERPL-MCNC: 0.54 MG/DL — SIGNIFICANT CHANGE UP (ref 0.5–1.3)
GLUCOSE SERPL-MCNC: 92 MG/DL — SIGNIFICANT CHANGE UP (ref 70–99)
HCT VFR BLD CALC: 28.8 % — LOW (ref 34.5–45)
HGB BLD-MCNC: 10.2 G/DL — LOW (ref 11.5–15.5)
MAGNESIUM SERPL-MCNC: 1.7 MG/DL — SIGNIFICANT CHANGE UP (ref 1.6–2.6)
MCHC RBC-ENTMCNC: 31.2 PG — SIGNIFICANT CHANGE UP (ref 27–34)
MCHC RBC-ENTMCNC: 35.5 GM/DL — SIGNIFICANT CHANGE UP (ref 32–36)
MCV RBC AUTO: 87.9 FL — SIGNIFICANT CHANGE UP (ref 80–100)
PHOSPHATE SERPL-MCNC: 4.6 MG/DL — HIGH (ref 2.5–4.5)
PLATELET # BLD AUTO: 185 K/UL — SIGNIFICANT CHANGE UP (ref 150–400)
POTASSIUM SERPL-MCNC: 4.1 MMOL/L — SIGNIFICANT CHANGE UP (ref 3.5–5.3)
POTASSIUM SERPL-SCNC: 4.1 MMOL/L — SIGNIFICANT CHANGE UP (ref 3.5–5.3)
RBC # BLD: 3.27 M/UL — LOW (ref 3.8–5.2)
RBC # FLD: 13.3 % — SIGNIFICANT CHANGE UP (ref 10.3–14.5)
SODIUM SERPL-SCNC: 140 MMOL/L — SIGNIFICANT CHANGE UP (ref 135–145)
WBC # BLD: 8.1 K/UL — SIGNIFICANT CHANGE UP (ref 3.8–10.5)
WBC # FLD AUTO: 8.1 K/UL — SIGNIFICANT CHANGE UP (ref 3.8–10.5)

## 2018-12-11 PROCEDURE — 80076 HEPATIC FUNCTION PANEL: CPT

## 2018-12-11 PROCEDURE — 86780 TREPONEMA PALLIDUM: CPT

## 2018-12-11 PROCEDURE — 80048 BASIC METABOLIC PNL TOTAL CA: CPT

## 2018-12-11 PROCEDURE — 85730 THROMBOPLASTIN TIME PARTIAL: CPT

## 2018-12-11 PROCEDURE — 88302 TISSUE EXAM BY PATHOLOGIST: CPT

## 2018-12-11 PROCEDURE — 85027 COMPLETE CBC AUTOMATED: CPT

## 2018-12-11 PROCEDURE — 93005 ELECTROCARDIOGRAM TRACING: CPT

## 2018-12-11 PROCEDURE — 59050 FETAL MONITOR W/REPORT: CPT

## 2018-12-11 PROCEDURE — 86900 BLOOD TYPING SEROLOGIC ABO: CPT

## 2018-12-11 PROCEDURE — 88307 TISSUE EXAM BY PATHOLOGIST: CPT

## 2018-12-11 PROCEDURE — C1889: CPT

## 2018-12-11 PROCEDURE — 83615 LACTATE (LD) (LDH) ENZYME: CPT

## 2018-12-11 PROCEDURE — 82570 ASSAY OF URINE CREATININE: CPT

## 2018-12-11 PROCEDURE — 84550 ASSAY OF BLOOD/URIC ACID: CPT

## 2018-12-11 PROCEDURE — 85610 PROTHROMBIN TIME: CPT

## 2018-12-11 PROCEDURE — 83735 ASSAY OF MAGNESIUM: CPT

## 2018-12-11 PROCEDURE — 36430 TRANSFUSION BLD/BLD COMPNT: CPT

## 2018-12-11 PROCEDURE — G0463: CPT

## 2018-12-11 PROCEDURE — 81003 URINALYSIS AUTO W/O SCOPE: CPT

## 2018-12-11 PROCEDURE — 84156 ASSAY OF PROTEIN URINE: CPT

## 2018-12-11 PROCEDURE — 86901 BLOOD TYPING SEROLOGIC RH(D): CPT

## 2018-12-11 PROCEDURE — 84100 ASSAY OF PHOSPHORUS: CPT

## 2018-12-11 PROCEDURE — 59025 FETAL NON-STRESS TEST: CPT

## 2018-12-11 PROCEDURE — 86850 RBC ANTIBODY SCREEN: CPT

## 2018-12-11 PROCEDURE — 71045 X-RAY EXAM CHEST 1 VIEW: CPT

## 2018-12-11 PROCEDURE — 82962 GLUCOSE BLOOD TEST: CPT

## 2018-12-11 PROCEDURE — 80053 COMPREHEN METABOLIC PANEL: CPT

## 2018-12-11 PROCEDURE — 83010 ASSAY OF HAPTOGLOBIN QUANT: CPT

## 2018-12-11 PROCEDURE — 86923 COMPATIBILITY TEST ELECTRIC: CPT

## 2018-12-11 PROCEDURE — P9016: CPT

## 2018-12-11 PROCEDURE — 85384 FIBRINOGEN ACTIVITY: CPT

## 2018-12-11 RX ORDER — FERROUS FUMARATE 350(115)MG
1 TABLET ORAL
Qty: 0 | Refills: 0 | COMMUNITY

## 2018-12-11 RX ORDER — EPINEPHRINE 0.3 MG/.3ML
1 INJECTION INTRAMUSCULAR; SUBCUTANEOUS
Qty: 0 | Refills: 0 | COMMUNITY

## 2018-12-11 RX ORDER — DIPHENHYDRAMINE HCL 50 MG
1 CAPSULE ORAL
Qty: 0 | Refills: 0 | COMMUNITY

## 2018-12-11 RX ORDER — HUMAN INSULIN 100 [IU]/ML
20 INJECTION, SUSPENSION SUBCUTANEOUS
Qty: 0 | Refills: 0 | COMMUNITY

## 2018-12-11 RX ADMIN — OXYCODONE HYDROCHLORIDE 5 MILLIGRAM(S): 5 TABLET ORAL at 03:53

## 2018-12-11 RX ADMIN — Medication 1 TABLET(S): at 12:45

## 2018-12-11 RX ADMIN — OXYCODONE HYDROCHLORIDE 5 MILLIGRAM(S): 5 TABLET ORAL at 04:45

## 2018-12-11 RX ADMIN — Medication 975 MILLIGRAM(S): at 01:24

## 2018-12-11 RX ADMIN — Medication 975 MILLIGRAM(S): at 02:20

## 2018-12-11 RX ADMIN — Medication 325 MILLIGRAM(S): at 12:45

## 2018-12-11 RX ADMIN — Medication 975 MILLIGRAM(S): at 07:03

## 2018-12-11 RX ADMIN — Medication 975 MILLIGRAM(S): at 13:15

## 2018-12-11 RX ADMIN — Medication 975 MILLIGRAM(S): at 12:45

## 2018-12-11 RX ADMIN — Medication 100 MILLIGRAM(S): at 07:03

## 2018-12-11 RX ADMIN — ENOXAPARIN SODIUM 40 MILLIGRAM(S): 100 INJECTION SUBCUTANEOUS at 12:44

## 2018-12-11 NOTE — PROGRESS NOTE ADULT - PROBLEM SELECTOR PLAN 2
- continue current analgesia regimen  - reg diet as tolerated  - voiding freely, no issues  - Lovenox/ambulation for DVT prophylaxis  - routine postop care.     S Amilcar, R3
- continue PCEA for analgesia  - reg diet as tolerated  - voiding freely, no issues  - SQH/ambulation for DVT prophylaxis  - pump at bedside  - appreciate SICU care. Likely transition to postpartum floor today if remains stable    S Amilcar, R3

## 2018-12-11 NOTE — DISCHARGE NOTE OB - PLAN OF CARE
return to normal activities over the next 5-6 weeks regular diet;   limited physical and sexual activities for 5-6 weeks;  to office in 2 weeks;  to monitor bp at home and call with elevations-parameters given;  for 2 hour GTT in 6 weeks;  to see allergist in the next few weeks monitor blood pressures 3 times a day

## 2018-12-11 NOTE — PROGRESS NOTE ADULT - ASSESSMENT
37 yo  POD#1 sp pLTCS and BTL cb hemorrhage with EBL 2L.  Postpartum, patient is hemodynamically stable with appropriate H/H s/p 2uPRBC transfusion.
37yo P3 POD#3 s/p primary low transverse C section and bilateral salpingectomy c/b PPH (EBL 2L) requiring 2upRBC. PP course c/b anaphylactic reaction to unknown substance on POD#1. Pt is s/p 24hr monitoring in SICU.
Patient is a 38F POD1  c/b postpartum hemorrhage s/p 2U PRBC >24 hours ago, now with anaphylactic reaction after simethicone and a turkey sandwich.  Rash, lip swelling, and SOB improving    Neuro: post-op pain control  - continue PCEA    Resp:  - continue duonebs  - monitor O2 sat, NC prn to maintain >94%  - close monitoring of resp status    Cards:  - s/p IM epi  - currently hemodynamically stable  - close monitoring for hypotension in setting of anaphylaxis    GI:  - reg diet, with pepcid  - NO simethicone  - bowel regimen PRN    :  - avila for close monitoring of UOP  - s/p  with hemorrhage, monitor vaginal bleeding    Heme:  - no DVT ppx at this time  - trend H/H    ID:   - no acute issues    Dispo: SICU for resp monitoring    PGY1/MD [place].
39yo P3 POD#2 s/p primary low transverse C section and bilateral salpingectomy c/b PPH (EBL 2L) requiring 2upRBC. PP course c/b anaphylactic reaction to unknown substance on POD#1. Pt being monitored in SICU.

## 2018-12-11 NOTE — PROGRESS NOTE ADULT - SUBJECTIVE AND OBJECTIVE BOX
OB PROGRESS NOTE  POD#3    Subjective:   Pt seen and examined at bedside, breastfeeding. She has no complaints. She denies chest pain, dyspnea, palpitations, lightheadedness or dizziness. She is tolerating regular diet and ambulating without assistance. Patient is voiding without issues and pain is well controlled on oral regimen. Vaginal bleeding is light    Objective:  Vital Signs Last 24 Hrs  T(C): 36.7 (11 Dec 2018 05:00), Max: 36.9 (10 Dec 2018 22:10)  T(F): 98 (11 Dec 2018 05:00), Max: 98.4 (10 Dec 2018 22:10)  HR: 98 (11 Dec 2018 05:00) (83 - 109)  BP: 137/82 (11 Dec 2018 05:00) (127/77 - 148/88)  BP(mean): 82 (10 Dec 2018 10:00) (82 - 102)  RR: 18 (11 Dec 2018 05:00) (15 - 51)  SpO2: 98% (10 Dec 2018 22:10) (96% - 100%)    I&O's Summary    09 Dec 2018 07:01  -  10 Dec 2018 07:00  --------------------------------------------------------  IN: 1400 mL / OUT: 2200 mL / NET: -800 mL    10 Dec 2018 07:01  -  11 Dec 2018 05:46  --------------------------------------------------------  IN: 200 mL / OUT: 1100 mL / NET: -900 mL    MEDICATIONS  (STANDING):  acetaminophen   Tablet .. 975 milliGRAM(s) Oral every 6 hours  diphtheria/tetanus/pertussis (acellular) Vaccine (ADAcel) 0.5 milliLiter(s) IntraMuscular once  docusate sodium 100 milliGRAM(s) Oral three times a day  enoxaparin Injectable 40 milliGRAM(s) SubCutaneous daily  ferrous    sulfate 325 milliGRAM(s) Oral daily  hydrocortisone 2.5% Lotion 1 Application(s) Topical two times a day  prenatal multivitamin 1 Tablet(s) Oral daily  senna 2 Tablet(s) Oral at bedtime    MEDICATIONS  (PRN):  dexamethasone  Injectable 4 milliGRAM(s) IV Push every 6 hours PRN Nausea, IF ondansetron is ineffective after 30 - 60 minutes  glycerin Suppository - Adult 1 Suppository(s) Rectal at bedtime PRN Constipation  lanolin Ointment 1 Application(s) Topical every 3 hours PRN Sore Nipples  ondansetron Injectable 4 milliGRAM(s) IV Push every 6 hours PRN Nausea  oxyCODONE    IR 5 milliGRAM(s) Oral every 6 hours PRN Moderate Pain (4 - 6)  oxyCODONE    IR 10 milliGRAM(s) Oral every 6 hours PRN Severe Pain (7 - 10)      Physical Exam:  Constitutional: no acute distress  CV: RRR  Lungs: clear to auscultation bilaterally  Abdomen: soft, nontender, no fundal tenderness  Incision: Pfannenstiel with steri strips clean/dry/intact  Extremities: no calf tenderness bilaterally

## 2018-12-11 NOTE — DISCHARGE NOTE OB - MEDICATION SUMMARY - MEDICATIONS TO STOP TAKING
I will STOP taking the medications listed below when I get home from the hospital:    HumuLIN N 100 units/mL subcutaneous suspension  -- 20 unit(s) subcutaneous once a day (at bedtime)

## 2018-12-11 NOTE — DISCHARGE NOTE OB - HOSPITAL COURSE
The patient was admitted for IOL for gestational hypertension and A2 diabetes.  She had a primary c/s for failure to descend.  she had an intraoperative hemorrhage and was transfused 2 units.  On post op day one, she had an anaphylactic reaction which was treated and she was taken to the SICU for further care.  She did well there and was transferred back to the floor yesterday.  She has done well.

## 2018-12-11 NOTE — PROGRESS NOTE ADULT - PROBLEM SELECTOR PLAN 1
- unknown etiology. Patient is hemodynamically stable with normal O2 sats on room air. Pt to follow up with Allergy/Immunology outpatient for workup. Likely discharge with epi-pen
-f/u AM CBC  -Encourage Ambulation  -Continue with regular diet  -Heparin, SCDs, and ambulation for DVT ppx  -Analgesia as needed  -Continue to monitor for s/s of anemia    KYREE Muro PGY3
- unknown etiology. No recurrence of events over 12hrs. Patient is hemodynamically stable with normal O2 sats on room air

## 2018-12-11 NOTE — PROGRESS NOTE ADULT - ATTENDING COMMENTS
Anaphylaxis resolved  On PO, DC IVF  HCT stable  Possible pharmacologic DVT prophylaxis form am  Allergy consult called  Transfer
above note appreciated  patient is feeling well  bp is 130-148/75-88  abd soft, non tender  incision clean and intact  labs stable  condisering going home today.  f/u 2 weeks in office and with allergist as outpatient
OB attending     Patient seen and examined.  Doing much better today.  Breathing normally, voiding and ambulating.  Tolerating regular diet.   POD 2 s/p  section c/b PPH s/p transfusion, POD1 patient with anaphylactic reaction to unknown substance, transferred to SICU. Patient hemodynamically stable now and ready for transfer to postpartum floor.  Will monitor closely and likely DC home with epi pen on POD4.

## 2018-12-11 NOTE — DISCHARGE NOTE OB - CARE PROVIDER_API CALL
Lottie Ellis), Obstetrics and Gynecology  [address] [facility]  [address] [place] [address]  Phone: [phone]  Fax: [phone]

## 2018-12-11 NOTE — DISCHARGE NOTE OB - MEDICATION SUMMARY - MEDICATIONS TO TAKE
I will START or STAY ON the medications listed below when I get home from the hospital:    ibuprofen 600 mg oral tablet  -- 1 tab(s) by mouth every 6 hours  -- Indication: For mild to moderate pain    acetaminophen 325 mg oral tablet  -- 3 tab(s) by mouth every 6 hours  -- Indication: For mild to moderate pain    Benadryl 25 mg oral tablet  -- 1 tab(s) by mouth 3 times a day, As Needed  -- Indication: For Allergic reaction    Epi EZ Pen  -- 1 dose(s) injectable once a day, As Needed  -- Indication: For Severe allergic reaction    Prenatal Multivitamins with Folic Acid 1 mg oral tablet  -- 1 tab(s) by mouth once a day  -- Indication: For Supplement    Brittany-Sequels  -- 1 tab(s) by mouth 2 times a day  -- Indication: For Anemia

## 2018-12-11 NOTE — DISCHARGE NOTE OB - CARE PLAN
Principal Discharge DX:	 delivery delivered  Goal:	return to normal activities over the next 5-6 weeks  Assessment and plan of treatment:	regular diet;   limited physical and sexual activities for 5-6 weeks;  to office in 2 weeks;  to monitor bp at home and call with elevations-parameters given;  for 2 hour GTT in 6 weeks;  to see allergist in the next few weeks  Secondary Diagnosis:	Anaphylaxis, subsequent encounter  Secondary Diagnosis:	Iron deficiency anemia secondary to blood loss (chronic) Principal Discharge DX:	 delivery delivered  Goal:	return to normal activities over the next 5-6 weeks  Assessment and plan of treatment:	regular diet;   limited physical and sexual activities for 5-6 weeks;  to office in 2 weeks;  to monitor bp at home and call with elevations-parameters given;  for 2 hour GTT in 6 weeks;  to see allergist in the next few weeks  Secondary Diagnosis:	Anaphylaxis, subsequent encounter  Assessment and plan of treatment:	monitor blood pressures 3 times a day  Secondary Diagnosis:	Iron deficiency anemia secondary to blood loss (chronic)

## 2018-12-11 NOTE — DISCHARGE NOTE OB - MATERIALS PROVIDED
Guide to Postpartum Care/Shaken Baby Prevention Handout/[facility] Hearing Screen Program/Back To Sleep Handout

## 2018-12-11 NOTE — DISCHARGE NOTE OB - PATIENT PORTAL LINK FT
You can access the [facility] Patient Portal, offered by [facility], by registering with the following website: [email or web address]

## 2018-12-13 ENCOUNTER — RX RENEWAL (OUTPATIENT)
Age: 38
End: 2018-12-13

## 2018-12-14 RX ORDER — BLOOD SUGAR DIAGNOSTIC
STRIP MISCELLANEOUS
Qty: 6 | Refills: 2 | Status: ACTIVE | COMMUNITY
Start: 2018-10-01 | End: 1900-01-01

## 2018-12-15 LAB — SURGICAL PATHOLOGY STUDY: SIGNIFICANT CHANGE UP

## 2019-01-18 ENCOUNTER — RESULT REVIEW (OUTPATIENT)
Age: 39
End: 2019-01-18

## 2020-05-18 NOTE — PATIENT PROFILE OB - PATIENT REPRESENTATIVE NAME
Alert-The patient is alert, awake and responds to voice. The patient is oriented to time, place, and person. The triage nurse is able to obtain subjective information. jaqueline ()

## 2020-06-15 NOTE — PATIENT PROFILE OB - BRADEN SCORE (IF 18 OR LESS ACTIVATE SKIN INJURY RISK INCREASED GUIDELINE), MLM
Pt alert with some mild confusion throughout shift. VSS. Denies pain. Up to chair for several hours. Non skid socks on. Bed and chair alarmed. Call light in reach. VQ scan today.    Problem: Patient/Family Goals  Goal: Patient/Family Long Term Goal  Descrip evaluate response  - Consider cultural and social influences on pain and pain management  - Manage/alleviate anxiety  - Utilize distraction and/or relaxation techniques  - Monitor for opioid side effects  - Notify MD/LIP if interventions unsuccessful or pa ADULT  Goal: Electrolytes maintained within normal limits  Description  INTERVENTIONS:  - Monitor labs and rhythm and assess patient for signs and symptoms of electrolyte imbalances  - Administer electrolyte replacement as ordered  - Monitor response to el 22

## 2020-12-10 ENCOUNTER — RESULT REVIEW (OUTPATIENT)
Age: 40
End: 2020-12-10

## 2021-03-17 ENCOUNTER — APPOINTMENT (OUTPATIENT)
Dept: ULTRASOUND IMAGING | Facility: CLINIC | Age: 41
End: 2021-03-17
Payer: COMMERCIAL

## 2021-03-17 ENCOUNTER — APPOINTMENT (OUTPATIENT)
Dept: MAMMOGRAPHY | Facility: CLINIC | Age: 41
End: 2021-03-17
Payer: COMMERCIAL

## 2021-03-17 ENCOUNTER — OUTPATIENT (OUTPATIENT)
Dept: OUTPATIENT SERVICES | Facility: HOSPITAL | Age: 41
LOS: 1 days | End: 2021-03-17
Payer: COMMERCIAL

## 2021-03-17 DIAGNOSIS — Z12.39 ENCOUNTER FOR OTHER SCREENING FOR MALIGNANT NEOPLASM OF BREAST: ICD-10-CM

## 2021-03-17 DIAGNOSIS — N60.19 DIFFUSE CYSTIC MASTOPATHY OF UNSPECIFIED BREAST: ICD-10-CM

## 2021-03-17 PROCEDURE — 77067 SCR MAMMO BI INCL CAD: CPT | Mod: 26

## 2021-03-17 PROCEDURE — 77067 SCR MAMMO BI INCL CAD: CPT

## 2021-03-17 PROCEDURE — 77063 BREAST TOMOSYNTHESIS BI: CPT

## 2021-03-17 PROCEDURE — 76641 ULTRASOUND BREAST COMPLETE: CPT

## 2021-03-17 PROCEDURE — 77063 BREAST TOMOSYNTHESIS BI: CPT | Mod: 26

## 2021-03-17 PROCEDURE — 76641 ULTRASOUND BREAST COMPLETE: CPT | Mod: 26,50

## 2021-04-03 ENCOUNTER — APPOINTMENT (OUTPATIENT)
Dept: MAMMOGRAPHY | Facility: CLINIC | Age: 41
End: 2021-04-03

## 2021-11-15 ENCOUNTER — APPOINTMENT (OUTPATIENT)
Dept: MRI IMAGING | Facility: CLINIC | Age: 41
End: 2021-11-15

## 2021-11-24 ENCOUNTER — TRANSCRIPTION ENCOUNTER (OUTPATIENT)
Age: 41
End: 2021-11-24

## 2021-12-06 ENCOUNTER — RESULT REVIEW (OUTPATIENT)
Age: 41
End: 2021-12-06

## 2024-11-05 ENCOUNTER — RESULT REVIEW (OUTPATIENT)
Age: 44
End: 2024-11-05

## 2025-05-20 NOTE — PATIENT PROFILE OB - PRETERM DELIVERIES, OB PROFILE
May 20, 2025    Michael Barajas  [address] [facility] [id number]    Dear Michael,   +  We recently received a refill request for Lisinopril and Metoprolol.  We have refilled this for a one time 90 day supply only because you are due for a:    Previsit, fasting lab appointment.    Please call at your earliest convenience so that there will not be a delay with your future refills.    Thank you,         Your [facility] Team/bmc  [phone]          Electronically signed         2